# Patient Record
Sex: MALE | Race: BLACK OR AFRICAN AMERICAN | Employment: OTHER | URBAN - METROPOLITAN AREA
[De-identification: names, ages, dates, MRNs, and addresses within clinical notes are randomized per-mention and may not be internally consistent; named-entity substitution may affect disease eponyms.]

---

## 2019-02-25 ENCOUNTER — OFFICE VISIT (OUTPATIENT)
Dept: INTERNAL MEDICINE CLINIC | Age: 59
End: 2019-02-25

## 2019-02-25 VITALS
SYSTOLIC BLOOD PRESSURE: 150 MMHG | RESPIRATION RATE: 19 BRPM | BODY MASS INDEX: 24.97 KG/M2 | DIASTOLIC BLOOD PRESSURE: 100 MMHG | WEIGHT: 168.6 LBS | HEIGHT: 69 IN | TEMPERATURE: 97.9 F | OXYGEN SATURATION: 95 % | HEART RATE: 61 BPM

## 2019-02-25 DIAGNOSIS — Z12.5 PROSTATE CANCER SCREENING: ICD-10-CM

## 2019-02-25 DIAGNOSIS — R74.8 ELEVATED LIVER ENZYMES: ICD-10-CM

## 2019-02-25 DIAGNOSIS — G47.33 OSA (OBSTRUCTIVE SLEEP APNEA): ICD-10-CM

## 2019-02-25 DIAGNOSIS — E78.2 MIXED HYPERLIPIDEMIA: ICD-10-CM

## 2019-02-25 DIAGNOSIS — I10 ESSENTIAL HYPERTENSION: Primary | ICD-10-CM

## 2019-02-25 DIAGNOSIS — Z98.1 S/P LUMBAR SPINAL FUSION: ICD-10-CM

## 2019-02-25 DIAGNOSIS — Z11.59 ENCOUNTER FOR HEPATITIS C SCREENING TEST FOR LOW RISK PATIENT: ICD-10-CM

## 2019-02-25 RX ORDER — HYDROCHLOROTHIAZIDE 25 MG/1
25 TABLET ORAL DAILY
COMMUNITY
Start: 2018-11-15 | End: 2020-02-24 | Stop reason: SDUPTHER

## 2019-02-25 RX ORDER — PRAZOSIN HYDROCHLORIDE 2 MG/1
2 CAPSULE ORAL
COMMUNITY
Start: 2018-11-15 | End: 2020-10-09

## 2019-02-25 RX ORDER — DOCUSATE SODIUM 100 MG/1
100 CAPSULE, LIQUID FILLED ORAL 2 TIMES DAILY
COMMUNITY
End: 2020-09-02

## 2019-02-25 RX ORDER — SERTRALINE HYDROCHLORIDE 100 MG/1
TABLET, FILM COATED ORAL
COMMUNITY
Start: 2018-11-15 | End: 2020-03-25 | Stop reason: SDUPTHER

## 2019-02-25 RX ORDER — LOSARTAN POTASSIUM 100 MG/1
TABLET ORAL
COMMUNITY
Start: 2018-11-15 | End: 2019-11-15

## 2019-02-25 RX ORDER — NAPROXEN 500 MG/1
500 TABLET ORAL 2 TIMES DAILY WITH MEALS
Qty: 30 TAB | Refills: 3 | Status: SHIPPED | OUTPATIENT
Start: 2019-02-25 | End: 2019-02-28 | Stop reason: SDUPTHER

## 2019-02-25 NOTE — PROGRESS NOTES
New Patient Evaluation Nakita Benton is a 64 y.o. male. They are here to establish care with the group and me as a primary care provider. he has seen Dr. Dasilva Monday at [facility] in the past.  The last visit was last year. 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 He has been on Xarelto. Now off. Has three remaining hemorrhoids. He is on antidepressants. He takes this for PTSD. He is on Zoloft. He will check in with the [place] psychiatrist.   
 
He has has been having some pain in his chest.  In 2015 he had chest pain. He went to the hospital.  Had a stress test which was normal at that time. On BP medications, no recent med changes. He has had elevated liver ezymes. He was on cholesterol but stopped this due to the enzymes. He has been off statin for the past year. He has TOMASA, on CPAP Had lumbar laminectomy--stable after this. There are no active problems to display for this patient. Current Outpatient Medications Medication Sig Dispense Refill  prazosin (MINIPRESS) 1 mg capsule Take  by mouth.  hydroCHLOROthiazide (HYDRODIURIL) 25 mg tablet Take  by mouth.  losartan (COZAAR) 100 mg tablet Take  by mouth.  sertraline (ZOLOFT) 100 mg tablet Take  by mouth.  mv-mn-folic ac-vit K-herb 605 (ALIVE ONCE DAILY WOMEN 50 PLUS) 800-100 mcg tab Take  by mouth.  Bifidobacterium Infantis (ALIGN) 4 mg cap Take  by mouth.  docusate sodium (COLACE) 100 mg capsule Take 100 mg by mouth two (2) times a day. Allergies Allergen Reactions  Pcn [Penicillins] Hives and Itching History reviewed. No pertinent past medical history. Past Surgical History:  
Procedure Laterality Date [address]  HX CERVICAL FUSION  2002 Family History Problem Relation Age of Onset  Stroke Mother  Prostate Cancer Father Social History Tobacco Use  Smoking status: Never Smoker  Smokeless tobacco: Never Used Substance Use Topics  Alcohol use: No  
  Frequency: Never Comment: quit jan 2019 Health Maintenance Topic Date Due  
 Hepatitis C Screening  1960  FOBT Q 1 YEAR AGE 50-75  02/02/2012  Shingrix Vaccine Age 50> (2 of 2) 02/14/2019  
 DTaP/Tdap/Td series (2 - Td) 12/20/2028  Influenza Age 5 to Adult  Completed Review of Systems Constitutional: Negative. HENT: Negative. Respiratory: Negative for cough. Cardiovascular: Negative for chest pain and palpitations. Gastrointestinal: Negative. Musculoskeletal: Negative. All other systems reviewed and are negative. Visit Vitals BP (!) 150/100 (BP 1 Location: Right arm, BP Patient Position: Sitting) Pulse 61 Temp 97.9 °F (36.6 °C) (Oral) Resp 19 Ht 5' 9.06\" (1.754 m) Wt 168 lb 9.6 oz (76.5 kg) SpO2 95% BMI 24.86 kg/m² Physical Exam  
Constitutional: He is well-developed, well-nourished, and in no distress. HENT:  
Mouth/Throat: Oropharynx is clear and moist.  
Neck: Normal range of motion. Cardiovascular: Normal rate and regular rhythm. No murmur heard. Pulmonary/Chest: Effort normal and breath sounds normal.  
Abdominal: Soft. Bowel sounds are normal.  
Musculoskeletal: He exhibits no edema. Lymphadenopathy:  
  He has no cervical adenopathy. ASSESSMENT/PLAN Diagnoses and all orders for this visit: 1. Essential hypertension 
-     CBC WITH AUTOMATED DIFF 2. TOMASA (obstructive sleep apnea) -     REFERRAL TO SLEEP STUDIES 3. Prostate cancer screening -     PSA, DIAGNOSTIC (PROSTATE SPECIFIC AG) 4. Mixed hyperlipidemia -     METABOLIC PANEL, COMPREHENSIVE 
-     LIPID PANEL 5. Encounter for hepatitis C screening test for low risk patient 
-     HEPATITIS C AB 6. Elevated liver enzymes 7. S/P lumbar spinal fusion Follow-up Disposition: 
Return in about 6 weeks (around 4/8/2019) for Full Physical - 30 minutes appointment. 
 
-Discussed with the patient to continue the current plan of care. We will obtain baseline labwork and determine if any adjustments need to be done. We will also await the records of the previous PCP to ascertain further details of the patient's history. The patient agrees with and understands the plan of care. All questions have been answered.

## 2019-02-26 LAB
ALBUMIN SERPL-MCNC: 4.9 G/DL (ref 3.5–5.5)
ALBUMIN/GLOB SERPL: 1.9 {RATIO} (ref 1.2–2.2)
ALP SERPL-CCNC: 57 IU/L (ref 39–117)
ALT SERPL-CCNC: 88 IU/L (ref 0–44)
AST SERPL-CCNC: 120 IU/L (ref 0–40)
BASOPHILS # BLD AUTO: 0 X10E3/UL (ref 0–0.2)
BASOPHILS NFR BLD AUTO: 1 %
BILIRUB SERPL-MCNC: 0.3 MG/DL (ref 0–1.2)
BUN SERPL-MCNC: 14 MG/DL (ref 6–24)
BUN/CREAT SERPL: 17 (ref 9–20)
CALCIUM SERPL-MCNC: 9.8 MG/DL (ref 8.7–10.2)
CHLORIDE SERPL-SCNC: 101 MMOL/L (ref 96–106)
CHOLEST SERPL-MCNC: 189 MG/DL (ref 100–199)
CO2 SERPL-SCNC: 25 MMOL/L (ref 20–29)
CREAT SERPL-MCNC: 0.82 MG/DL (ref 0.76–1.27)
EOSINOPHIL # BLD AUTO: 0.1 X10E3/UL (ref 0–0.4)
EOSINOPHIL NFR BLD AUTO: 3 %
ERYTHROCYTE [DISTWIDTH] IN BLOOD BY AUTOMATED COUNT: 16.7 % (ref 12.3–15.4)
GLOBULIN SER CALC-MCNC: 2.6 G/DL (ref 1.5–4.5)
GLUCOSE SERPL-MCNC: 86 MG/DL (ref 65–99)
HCT VFR BLD AUTO: 43.8 % (ref 37.5–51)
HCV AB S/CO SERPL IA: <0.1 S/CO RATIO (ref 0–0.9)
HDLC SERPL-MCNC: 32 MG/DL
HGB BLD-MCNC: 14.4 G/DL (ref 13–17.7)
IMM GRANULOCYTES # BLD AUTO: 0 X10E3/UL (ref 0–0.1)
IMM GRANULOCYTES NFR BLD AUTO: 0 %
LDLC SERPL CALC-MCNC: 105 MG/DL (ref 0–99)
LYMPHOCYTES # BLD AUTO: 1.4 X10E3/UL (ref 0.7–3.1)
LYMPHOCYTES NFR BLD AUTO: 40 %
MCH RBC QN AUTO: 29.4 PG (ref 26.6–33)
MCHC RBC AUTO-ENTMCNC: 32.9 G/DL (ref 31.5–35.7)
MCV RBC AUTO: 90 FL (ref 79–97)
MONOCYTES # BLD AUTO: 0.3 X10E3/UL (ref 0.1–0.9)
MONOCYTES NFR BLD AUTO: 9 %
NEUTROPHILS # BLD AUTO: 1.6 X10E3/UL (ref 1.4–7)
NEUTROPHILS NFR BLD AUTO: 47 %
PLATELET # BLD AUTO: 173 X10E3/UL (ref 150–379)
POTASSIUM SERPL-SCNC: 4.8 MMOL/L (ref 3.5–5.2)
PROT SERPL-MCNC: 7.5 G/DL (ref 6–8.5)
PSA SERPL-MCNC: 0.6 NG/ML (ref 0–4)
RBC # BLD AUTO: 4.89 X10E6/UL (ref 4.14–5.8)
SODIUM SERPL-SCNC: 140 MMOL/L (ref 134–144)
TRIGL SERPL-MCNC: 258 MG/DL (ref 0–149)
VLDLC SERPL CALC-MCNC: 52 MG/DL (ref 5–40)
WBC # BLD AUTO: 3.5 X10E3/UL (ref 3.4–10.8)

## 2019-02-28 DIAGNOSIS — Z98.1 S/P LUMBAR SPINAL FUSION: ICD-10-CM

## 2019-02-28 RX ORDER — NAPROXEN 500 MG/1
500 TABLET ORAL 2 TIMES DAILY WITH MEALS
Qty: 30 TAB | Refills: 3 | Status: SHIPPED | OUTPATIENT
Start: 2019-02-28 | End: 2019-05-16

## 2019-02-28 NOTE — TELEPHONE ENCOUNTER
Roxana Patel (Self) [phone] (H)     Pt leaving tomorrow at 0400 to fly to [facility] [address] his back is causing significant pain. Says his naproxen was sent to [facility], but would like sent to [facility] today, if possible.   Pt would like a call when it's sent

## 2019-04-09 ENCOUNTER — OFFICE VISIT (OUTPATIENT)
Dept: INTERNAL MEDICINE CLINIC | Age: 59
End: 2019-04-09

## 2019-04-09 VITALS
DIASTOLIC BLOOD PRESSURE: 88 MMHG | HEART RATE: 86 BPM | TEMPERATURE: 98.7 F | RESPIRATION RATE: 18 BRPM | SYSTOLIC BLOOD PRESSURE: 125 MMHG | BODY MASS INDEX: 25.06 KG/M2 | OXYGEN SATURATION: 98 % | WEIGHT: 169.2 LBS | HEIGHT: 69 IN

## 2019-04-09 DIAGNOSIS — Z98.1 S/P LUMBAR SPINAL FUSION: ICD-10-CM

## 2019-04-09 DIAGNOSIS — Z00.00 WELL ADULT EXAM: Primary | ICD-10-CM

## 2019-04-09 DIAGNOSIS — Z12.11 COLON CANCER SCREENING: ICD-10-CM

## 2019-04-09 RX ORDER — NAPROXEN 500 MG/1
500 TABLET ORAL 2 TIMES DAILY WITH MEALS
Qty: 90 TAB | Refills: 0 | Status: CANCELLED | OUTPATIENT
Start: 2019-04-09

## 2019-04-09 NOTE — PROGRESS NOTES
Comprehensive Physical Examination    Abdirizak Diaz II is a 64 y.o. male. he presents for a comprehensive physical examination. There are no active problems to display for this patient. Current Outpatient Medications   Medication Sig Dispense Refill    naproxen (NAPROSYN) 500 mg tablet Take 1 Tab by mouth two (2) times daily (with meals). 30 Tab 3    prazosin (MINIPRESS) 1 mg capsule Take  by mouth.  hydroCHLOROthiazide (HYDRODIURIL) 25 mg tablet Take  by mouth.  losartan (COZAAR) 100 mg tablet Take  by mouth.  sertraline (ZOLOFT) 100 mg tablet Take  by mouth.  mv-mn-folic ac-vit K-herb 299 (ALIVE ONCE DAILY WOMEN 50 PLUS) 800-100 mcg tab Take  by mouth.  docusate sodium (COLACE) 100 mg capsule Take 100 mg by mouth two (2) times a day.  Bifidobacterium Infantis (ALIGN) 4 mg cap Take  by mouth. Allergies   Allergen Reactions    Pcn [Penicillins] Hives and Itching     History reviewed. No pertinent past medical history.   Past Surgical History:   Procedure Laterality Date    HX APPENDECTOMY  1975    HX CERVICAL FUSION  2002     Family History   Problem Relation Age of Onset    Stroke Mother     Prostate Cancer Father      Social History     Tobacco Use    Smoking status: Never Smoker    Smokeless tobacco: Never Used   Substance Use Topics    Alcohol use: No     Frequency: Never     Comment: quit jan 2019        Health Maintenance   Topic Date Due    FOBT Q 1 YEAR AGE 50-75  02/02/2012    Shingrix Vaccine Age 50> (2 of 2) 02/14/2019    Influenza Age 5 to Adult  08/01/2019    DTaP/Tdap/Td series (2 - Td) 12/20/2028    Hepatitis C Screening  Completed    Pneumococcal 0-64 years  Aged Out         ROS      Visit Vitals  BP (!) 140/110 (BP 1 Location: Left arm, BP Patient Position: Sitting)   Pulse 86   Temp 98.7 °F (37.1 °C) (Oral)   Resp 18   Ht 5' 9.06\" (1.754 m)   Wt 169 lb 3.2 oz (76.7 kg)   SpO2 98%   BMI 24.94 kg/m²       Physical Exam      ASSESSMENT/PLAN  Diagnoses and all orders for this visit:    1. S/P lumbar spinal fusion  -     naproxen (NAPROSYN) 500 mg tablet; Take 1 Tab by mouth two (2) times daily (with meals).     Other orders  -     OCCULT BLOOD IMMUNOASSAY,DIAGNOSTIC

## 2019-04-09 NOTE — PATIENT INSTRUCTIONS
Learning About Dietary Guidelines  What are the Dietary Guidelines for Americans? Dietary Guidelines for Americans provide tips for eating well and staying healthy. This helps reduce the risk for long-term (chronic) diseases. These adult guidelines from [place] recommend that you:  · Eat lots of fruits, vegetables, whole grains, and low-fat or nonfat dairy products. · Try to balance your eating with your activity. This helps you stay at a healthy weight. · Drink alcohol in moderation, if at all. · Limit foods high in salt, saturated fat, trans fat, and added sugar. What is MyPlate? MyPlate is the U.S[place] government's food guide. It can help you make your own well-balanced eating plan. A balanced eating plan means that you eat enough, but not too much, and that your food gives you the nutrients you need to stay healthy. MyPlate focuses on eating plenty of whole grains, fruits, and vegetables, and on limiting fat and sugar. It is available online at www. ChooseMyPlate.gov. How can you get started? MyPlate suggests that most adults eat certain amounts from the different food groups:  Grains  Eat 5 to 8 ounces of grains each day. Half of those should be whole grains. Choose whole-grain breads, cold and cooked cereals and grains, pasta (without creamy sauces), hard rolls, or low-fat or fat-free crackers. Vegetables  Eat 2 to 3 cups of vegetables every day. They contain little if any fat. And they have lots of nutrients that help protect against heart disease. Fruits  Eat 1½ to 2 cups of fruits every day. Fruits contain very little fat but lots of nutrients. Protein foods  Most adults need 5 to 6½ ounces each day. Choose fish and lean poultry more often. Eat red meat and fried meats less often. Dried beans, tofu, and nuts are also good sources of protein. Dairy  Most adults need 3 cups of milk and milk products a day. Choose low-fat or fat-free products from this food group.  If you have problems digesting milk, try eating cheese or yogurt instead. Limit fats and oils, including those used in cooking. When you do use fats, choose oils that are liquid at room temperature (unsaturated fats). These include canola oil and olive oil. Avoid foods with trans fats, such as many fried foods, cookies, and snack foods. Where can you learn more? Go to http://derek-latasha.info/. Enter A812 in the search box to learn more about \"Learning About Dietary Guidelines. \"  Current as of: March 28, 2018  Content Version: 11.9  © 0154-0047 [employer]. Care instructions adapted under license by [employer] (which disclaims liability or warranty for this information). If you have questions about a medical condition or this instruction, always ask your healthcare professional. [address] any warranty or liability for your use of this information. DASH Diet: Care Instructions  Your Care Instructions    The DASH diet is an eating plan that can help lower your blood pressure. DASH stands for Dietary Approaches to Stop Hypertension. Hypertension is high blood pressure. The DASH diet focuses on eating foods that are high in calcium, potassium, and magnesium. These nutrients can lower blood pressure. The foods that are highest in these nutrients are fruits, vegetables, low-fat dairy products, nuts, seeds, and legumes. But taking calcium, potassium, and magnesium supplements instead of eating foods that are high in those nutrients does not have the same effect. The DASH diet also includes whole grains, fish, and poultry. The DASH diet is one of several lifestyle changes your doctor may recommend to lower your high blood pressure. Your doctor may also want you to decrease the amount of sodium in your diet. Lowering sodium while following the DASH diet can lower blood pressure even further than just the DASH diet alone.   Follow-up care is a key part of your treatment and safety. Be sure to make and go to all appointments, and call your doctor if you are having problems. It's also a good idea to know your test results and keep a list of the medicines you take. How can you care for yourself at home? Following the DASH diet  · Eat 4 to 5 servings of fruit each day. A serving is 1 medium-sized piece of fruit, ½ cup chopped or canned fruit, 1/4 cup dried fruit, or 4 ounces (½ cup) of fruit juice. Choose fruit more often than fruit juice. · Eat 4 to 5 servings of vegetables each day. A serving is 1 cup of lettuce or raw leafy vegetables, ½ cup of chopped or cooked vegetables, or 4 ounces (½ cup) of vegetable juice. Choose vegetables more often than vegetable juice. · Get 2 to 3 servings of low-fat and fat-free dairy each day. A serving is 8 ounces of milk, 1 cup of yogurt, or 1 ½ ounces of cheese. · Eat 6 to 8 servings of grains each day. A serving is 1 slice of bread, 1 ounce of dry cereal, or ½ cup of cooked rice, pasta, or cooked cereal. Try to choose whole-grain products as much as possible. · Limit lean meat, poultry, and fish to 2 servings each day. A serving is 3 ounces, about the size of a deck of cards. · Eat 4 to 5 servings of nuts, seeds, and legumes (cooked dried beans, lentils, and split peas) each week. A serving is 1/3 cup of nuts, 2 tablespoons of seeds, or ½ cup of cooked beans or peas. · Limit fats and oils to 2 to 3 servings each day. A serving is 1 teaspoon of vegetable oil or 2 tablespoons of salad dressing. · Limit sweets and added sugars to 5 servings or less a week. A serving is 1 tablespoon jelly or jam, ½ cup sorbet, or 1 cup of lemonade. · Eat less than 2,300 milligrams (mg) of sodium a day. If you limit your sodium to 1,500 mg a day, you can lower your blood pressure even more. Tips for success  · Start small. Do not try to make dramatic changes to your diet all at once.  You might feel that you are missing out on your favorite foods and then be more likely to not follow the plan. Make small changes, and stick with them. Once those changes become habit, add a few more changes. · Try some of the following:  ? Make it a goal to eat a fruit or vegetable at every meal and at snacks. This will make it easy to get the recommended amount of fruits and vegetables each day. ? Try yogurt topped with fruit and nuts for a snack or healthy dessert. ? Add lettuce, tomato, cucumber, and onion to sandwiches. ? Combine a ready-made pizza crust with low-fat mozzarella cheese and lots of vegetable toppings. Try using tomatoes, squash, spinach, broccoli, carrots, cauliflower, and onions. ? Have a variety of cut-up vegetables with a low-fat dip as an appetizer instead of chips and dip. ? Sprinkle sunflower seeds or chopped almonds over salads. Or try adding chopped walnuts or almonds to cooked vegetables. ? Try some vegetarian meals using beans and peas. Add garbanzo or kidney beans to salads. Make burritos and tacos with mashed benitez beans or black beans. Where can you learn more? Go to http://derek-latasha.info/. Enter L137 in the search box to learn more about \"DASH Diet: Care Instructions. \"  Current as of: July 22, 2018  Content Version: 11.9  © 7941-9067 "[employer]. Care instructions adapted under license by [employer] (which disclaims liability or warranty for this information). If you have questions about a medical condition or this instruction, always ask your healthcare professional. [place] [address] any warranty or liability for your use of this information.

## 2019-05-16 ENCOUNTER — OFFICE VISIT (OUTPATIENT)
Dept: URGENT CARE | Age: 59
End: 2019-05-16

## 2019-05-16 VITALS
BODY MASS INDEX: 25.03 KG/M2 | DIASTOLIC BLOOD PRESSURE: 85 MMHG | HEIGHT: 69 IN | OXYGEN SATURATION: 97 % | WEIGHT: 169 LBS | RESPIRATION RATE: 18 BRPM | TEMPERATURE: 98.7 F | SYSTOLIC BLOOD PRESSURE: 134 MMHG | HEART RATE: 87 BPM

## 2019-05-16 DIAGNOSIS — R05.9 COUGH: Primary | ICD-10-CM

## 2019-05-16 RX ORDER — PREDNISONE 20 MG/1
TABLET ORAL
Qty: 6 TAB | Refills: 0 | Status: SHIPPED | OUTPATIENT
Start: 2019-05-16 | End: 2019-08-06 | Stop reason: ALTCHOICE

## 2019-05-16 RX ORDER — BENZONATATE 200 MG/1
200 CAPSULE ORAL
Qty: 21 CAP | Refills: 0 | Status: SHIPPED | OUTPATIENT
Start: 2019-05-16 | End: 2019-05-23

## 2019-05-17 NOTE — PROGRESS NOTES
Here accompanied by wife who has similar symptoms  Here for cough x 1 week  Dry to occasionally productive   Did have some wheezing in evening  He denies fever, chills, pleuritic pain, malaise or SOB  Has not tried any medications at this point  Overall not improving. No worsening. History reviewed. No pertinent past medical history.      Past Surgical History:   Procedure Laterality Date    HX APPENDECTOMY  1975    HX CERVICAL FUSION  2002         Family History   Problem Relation Age of Onset    Stroke Mother     Prostate Cancer Father         Social History     Socioeconomic History    Marital status:      Spouse name: Not on file    Number of children: Not on file    Years of education: Not on file    Highest education level: Not on file   Occupational History    Not on file   Social Needs    Financial resource strain: Not on file    Food insecurity:     Worry: Not on file     Inability: Not on file    Transportation needs:     Medical: Not on file     Non-medical: Not on file   Tobacco Use    Smoking status: Never Smoker    Smokeless tobacco: Never Used   Substance and Sexual Activity    Alcohol use: No     Frequency: Never     Comment: quit jan 2019    Drug use: No    Sexual activity: Yes     Partners: Female   Lifestyle    Physical activity:     Days per week: Not on file     Minutes per session: Not on file    Stress: Not on file   Relationships    Social connections:     Talks on phone: Not on file     Gets together: Not on file     Attends Yazidism service: Not on file     Active member of club or organization: Not on file     Attends meetings of clubs or organizations: Not on file     Relationship status: Not on file    Intimate partner violence:     Fear of current or ex partner: Not on file     Emotionally abused: Not on file     Physically abused: Not on file     Forced sexual activity: Not on file   Other Topics Concern    Not on file   Social History Narrative    Not on file                ALLERGIES: Pcn [penicillins]    Review of Systems   All other systems reviewed and are negative. Vitals:    05/16/19 1947 05/16/19 2010   BP: 150/87 134/85   Pulse: 87    Resp: 18    Temp: 98.7 °F (37.1 °C)    SpO2: 97%    Weight: 169 lb (76.7 kg)    Height: 5' 9\" (1.753 m)        Physical Exam   Constitutional: He is oriented to person, place, and time. He appears well-developed and well-nourished. HENT:   Head: Normocephalic and atraumatic. Nose: Nose normal.   Mouth/Throat: Oropharynx is clear and moist. No oropharyngeal exudate. TMs normal   Eyes: Pupils are equal, round, and reactive to light. Conjunctivae are normal. No scleral icterus. Neck: Normal range of motion. Neck supple. Cardiovascular: Normal rate, regular rhythm and normal heart sounds. Pulmonary/Chest: Effort normal and breath sounds normal. No respiratory distress. He has no wheezes. He has no rales. Abdominal: Soft. There is no rebound. Musculoskeletal:   No LE edema   Lymphadenopathy:     He has no cervical adenopathy. Neurological: He is alert and oriented to person, place, and time. Skin: Skin is warm and dry. No rash noted. Psychiatric: He has a normal mood and affect. His behavior is normal. Thought content normal.   Vitals reviewed. MDM     Differential Diagnosis; Clinical Impression; Plan:       CLINICAL IMPRESSION:  (R05) Cough  (primary encounter diagnosis)    Orders Placed This Encounter      benzonatate (TESSALON) 200 mg capsule          Sig: Take 1 Cap by mouth three (3) times daily as needed for Cough for up to 7 days. Dispense:  21 Cap          Refill:  0      predniSONE (DELTASONE) 20 mg tablet          Sig: Take 2 tabs by mouth one time daily with food for 3 days.           Dispense:  6 Tab          Refill:  0      Plan:  No particular etiology uncovered  possible viral given wife with similar  Tessalon for cough  Prednisone given reported wheeze at night although no hx of asthma. No indication for CXR today. We have reviewed concerning signs/symptoms, normal vs abnormal progression of medical condition and when to seek immediate medical attention. Schedule with PCP or Urgent Care immediately for worsening or new symptoms. See your PCP if there is not at least some improvement in symptoms within the next 1 week  You should see your PCP for updates on your routine health maintenance.              Procedures

## 2019-05-17 NOTE — PATIENT INSTRUCTIONS
Follow up with PCP without improvement over next 5-7 days sooner/immediately for new or worsening       Cough: Care Instructions  Your Care Instructions    A cough is your body's response to something that bothers your throat or airways. Many things can cause a cough. You might cough because of a cold or the flu, bronchitis, or asthma. Smoking, postnasal drip, allergies, and stomach acid that backs up into your throat also can cause coughs. A cough is a symptom, not a disease. Most coughs stop when the cause, such as a cold, goes away. You can take a few steps at home to cough less and feel better. Follow-up care is a key part of your treatment and safety. Be sure to make and go to all appointments, and call your doctor if you are having problems. It's also a good idea to know your test results and keep a list of the medicines you take. How can you care for yourself at home? · Drink lots of water and other fluids. This helps thin the mucus and soothes a dry or sore throat. Honey or lemon juice in hot water or tea may ease a dry cough. · Take cough medicine as directed by your doctor. · Prop up your head on pillows to help you breathe and ease a dry cough. · Try cough drops to soothe a dry or sore throat. Cough drops don't stop a cough. Medicine-flavored cough drops are no better than candy-flavored drops or hard candy. · Do not smoke. Avoid secondhand smoke. If you need help quitting, talk to your doctor about stop-smoking programs and medicines. These can increase your chances of quitting for good. When should you call for help? Call 911 anytime you think you may need emergency care.  For example, call if:    · You have severe trouble breathing.    Call your doctor now or seek immediate medical care if:    · You cough up blood.     · You have new or worse trouble breathing.     · You have a new or higher fever.     · You have a new rash.    Watch closely for changes in your health, and be sure to contact your doctor if:    · You cough more deeply or more often, especially if you notice more mucus or a change in the color of your mucus.     · You have new symptoms, such as a sore throat, an earache, or sinus pain.     · You do not get better as expected. Where can you learn more? Go to http://derek-latasha.info/. Enter D279 in the search box to learn more about \"Cough: Care Instructions. \"  Current as of: September 5, 2018  Content Version: 11.9  © 6181-8541 [employer]. Care instructions adapted under license by [employer] (which disclaims liability or warranty for this information). If you have questions about a medical condition or this instruction, always ask your healthcare professional. [address] any warranty or liability for your use of this information.

## 2019-07-23 ENCOUNTER — OFFICE VISIT (OUTPATIENT)
Dept: INTERNAL MEDICINE CLINIC | Age: 59
End: 2019-07-23

## 2019-07-23 ENCOUNTER — TELEPHONE (OUTPATIENT)
Dept: INTERNAL MEDICINE CLINIC | Age: 59
End: 2019-07-23

## 2019-07-23 VITALS
HEART RATE: 88 BPM | DIASTOLIC BLOOD PRESSURE: 78 MMHG | RESPIRATION RATE: 18 BRPM | HEIGHT: 69 IN | WEIGHT: 193.4 LBS | BODY MASS INDEX: 28.64 KG/M2 | OXYGEN SATURATION: 97 % | TEMPERATURE: 98.6 F | SYSTOLIC BLOOD PRESSURE: 120 MMHG

## 2019-07-23 DIAGNOSIS — I82.441 ACUTE DEEP VEIN THROMBOSIS (DVT) OF TIBIAL VEIN OF RIGHT LOWER EXTREMITY (HCC): Primary | ICD-10-CM

## 2019-07-23 DIAGNOSIS — M79.89 PAIN AND SWELLING OF RIGHT LOWER LEG: ICD-10-CM

## 2019-07-23 DIAGNOSIS — M79.661 PAIN AND SWELLING OF RIGHT LOWER LEG: ICD-10-CM

## 2019-07-23 RX ORDER — NAPROXEN SODIUM 220 MG
220 TABLET ORAL 2 TIMES DAILY WITH MEALS
Qty: 20 TAB | Refills: 0 | Status: SHIPPED | OUTPATIENT
Start: 2019-07-23 | End: 2019-07-23 | Stop reason: ALTCHOICE

## 2019-07-23 NOTE — PATIENT INSTRUCTIONS
Leg Pain: Care Instructions  Your Care Instructions  Many things can cause leg pain. Too much exercise or overuse can cause a muscle cramp (or charley horse). You can get leg cramps from not eating a balanced diet that has enough potassium, calcium, and other minerals. If you do not drink enough fluids or are taking certain medicines, you may develop leg cramps. Other causes of leg pain include injuries, blood flow problems, nerve damage, and twisted and enlarged veins (varicose veins). You can usually ease pain with self-care. Your doctor may recommend that you rest your leg and keep it elevated. Follow-up care is a key part of your treatment and safety. Be sure to make and go to all appointments, and call your doctor if you are having problems. It's also a good idea to know your test results and keep a list of the medicines you take. How can you care for yourself at home? · Take pain medicines exactly as directed. ? If the doctor gave you a prescription medicine for pain, take it as prescribed. ? If you are not taking a prescription pain medicine, ask your doctor if you can take an over-the-counter medicine. · Take any other medicines exactly as prescribed. Call your doctor if you think you are having a problem with your medicine. · Rest your leg while you have pain, and avoid standing for long periods of time. · Prop up your leg at or above the level of your heart when possible. · Make sure you are eating a balanced diet that is rich in calcium, potassium, and magnesium, especially if you are pregnant. · If directed by your doctor, put ice or a cold pack on the area for 10 to 20 minutes at a time. Put a thin cloth between the ice and your skin. · Your leg may be in a splint, a brace, or an elastic bandage, and you may have crutches to help you walk. Follow your doctor's directions about how long to wear supports and how to use the crutches. When should you call for help?   Call 911 anytime you think you may need emergency care. For example, call if:    · You have sudden chest pain and shortness of breath, or you cough up blood.     · Your leg is cool or pale or changes color.    Call your doctor now or seek immediate medical care if:    · You have increasing or severe pain.     · Your leg suddenly feels weak and you cannot move it.     · You have signs of a blood clot, such as:  ? Pain in your calf, back of the knee, thigh, or groin. ? Redness and swelling in your leg or groin.     · You have signs of infection, such as:  ? Increased pain, swelling, warmth, or redness. ? Red streaks leading from the sore area. ? Pus draining from a place on your leg. ? A fever.     · You cannot bear weight on your leg.    Watch closely for changes in your health, and be sure to contact your doctor if:    · You do not get better as expected. Where can you learn more? Go to http://derek-latasha.info/. Enter U216 in the search box to learn more about \"Leg Pain: Care Instructions. \"  Current as of: September 23, 2018  Content Version: 12.1  © 4347-5238 [employer]. Care instructions adapted under license by [employer] (which disclaims liability or warranty for this information). If you have questions about a medical condition or this instruction, always ask your healthcare professional. [address] any warranty or liability for your use of this information. Deep Vein Thrombosis: Care Instructions  Your Care Instructions    A deep vein thrombosis (DVT) is a blood clot in certain veins of the legs, pelvis, or arms. Blood clots in these veins need to be treated because they can get bigger, break loose, and travel through the bloodstream to the lungs. A blood clot in a lung can be life-threatening. The doctor may have given you a blood thinner (anticoagulant).  A blood thinner can stop the blood clot from growing larger and prevent new clots from forming. You will need to take a blood thinner for 3 to 6 months or longer. The doctor has checked you carefully, but problems can develop later. If you notice any problems or new symptoms, get medical treatment right away. Follow-up care is a key part of your treatment and safety. Be sure to make and go to all appointments, and call your doctor if you are having problems. It's also a good idea to know your test results and keep a list of the medicines you take. How can you care for yourself at home? · Take your medicines exactly as prescribed. Call your doctor if you think you are having a problem with your medicine. · If you are taking a blood thinner, be sure you get instructions about how to take your medicine safely. Blood thinners can cause serious bleeding problems. · Wear compression stockings if your doctor recommends them. These stockings are tighter at the feet than on the legs. They may reduce pain and swelling in your legs. But there are different types of stockings, and they need to fit right. So your doctor will recommend what you need. · When you sit, use a pillow to raise the arm or leg that has the blood clot. Try to keep it above the level of your heart. When should you call for help? Call 911 anytime you think you may need emergency care. For example, call if:    · You passed out (lost consciousness).     · You have symptoms of a blood clot in your lung (called a pulmonary embolism). These include:  ? Sudden chest pain. ? Trouble breathing. ? Coughing up blood.    Call your doctor now or seek immediate medical care if:    · You have new or worse trouble breathing.     · You are dizzy or lightheaded, or you feel like you may faint.     · You have symptoms of a blood clot in your arm or leg. These may include:  ? Pain in the arm, calf, back of the knee, thigh, or groin. ?  Redness and swelling in the arm, leg, or groin.    Watch closely for changes in your health, and be sure to contact your doctor if:    · You do not get better as expected. Where can you learn more? Go to http://derek-latasha.info/. Enter I701 in the search box to learn more about \"Deep Vein Thrombosis: Care Instructions. \"  Current as of: September 26, 2018  Content Version: 12.1  © 9914-5504 [employer]. Care instructions adapted under license by [employer] (which disclaims liability or warranty for this information). If you have questions about a medical condition or this instruction, always ask your healthcare professional. [address] any warranty or liability for your use of this information.

## 2019-07-23 NOTE — TELEPHONE ENCOUNTER
Right ankle swelling, moving up chin. Noticed Saturday, pain 7/10. Blood clot in left leg last July. Took Xarelto but no longer. Feeling same exact sx in right leg, difficulty ambulating. Down in [place] taking care of step father due to recent knee replacement surgery.   scheduled patient today with Dr Farnaz Dillard @ 2:20p for eval.

## 2019-07-23 NOTE — PROGRESS NOTES
HISTORY OF PRESENT ILLNESS  Maged Altamirano II is a 64 y.o. male. Leg Pain    The history is provided by the patient. This is a new problem. Episode onset: 3 d. The problem occurs constantly. The problem has not changed since onset. The pain is present in the right lower leg. The quality of the pain is described as aching. The pain is at a severity of 6/10. Associated symptoms include limited range of motion and stiffness. Pertinent negatives include no tingling. The symptoms are aggravated by contact and activity. He has tried OTC pain medications (ibuprofen) for the symptoms. The treatment provided mild relief. There has been no history of extremity trauma (however was very active preceding his symptom onset, up and down stairs). pmhx of lle dvt       Review of Systems   Musculoskeletal: Positive for stiffness. Neurological: Negative for tingling. Physical Exam   Cardiovascular: Normal rate and regular rhythm. Exam reveals no gallop and no friction rub. No murmur heard. Pulmonary/Chest: Effort normal and breath sounds normal.   Musculoskeletal:        Right ankle: He exhibits swelling. Left ankle: Normal.        Right lower leg: He exhibits no tenderness and no swelling. Feet:    Nursing note and vitals reviewed. ASSESSMENT and PLAN  Diagnoses and all orders for this visit:    1. Pain and swelling of right lower leg  -     DUPLEX LOWER EXT VENOUS BILAT; Future  -     naproxen sodium (ALEVE) 220 mg tablet; Take 1 Tab by mouth two (2) times daily (with meals) for 10 days. Start if doppler is negative for DVT    ADDENDUM- reviewed doppler with vascular tech Theresa Hollins pt back her in office.  + for dvt tibial veins    - disregard previous order for aleve  - start xarelto 15 mg bid x 3 wks    -

## 2019-08-06 ENCOUNTER — HOSPITAL ENCOUNTER (OUTPATIENT)
Dept: GENERAL RADIOLOGY | Age: 59
Discharge: HOME | End: 2019-08-06
Attending: INTERNAL MEDICINE
Payer: OTHER GOVERNMENT

## 2019-08-06 ENCOUNTER — OFFICE VISIT (OUTPATIENT)
Dept: INTERNAL MEDICINE CLINIC | Age: 59
End: 2019-08-06

## 2019-08-06 VITALS
DIASTOLIC BLOOD PRESSURE: 89 MMHG | BODY MASS INDEX: 28.64 KG/M2 | HEIGHT: 69 IN | OXYGEN SATURATION: 99 % | SYSTOLIC BLOOD PRESSURE: 143 MMHG | RESPIRATION RATE: 17 BRPM | HEART RATE: 93 BPM | WEIGHT: 193.4 LBS | TEMPERATURE: 98.3 F

## 2019-08-06 DIAGNOSIS — K59.00 CONSTIPATION, UNSPECIFIED CONSTIPATION TYPE: ICD-10-CM

## 2019-08-06 DIAGNOSIS — K59.00 CONSTIPATION, UNSPECIFIED CONSTIPATION TYPE: Primary | ICD-10-CM

## 2019-08-06 DIAGNOSIS — I82.441 ACUTE DEEP VEIN THROMBOSIS (DVT) OF TIBIAL VEIN OF RIGHT LOWER EXTREMITY (HCC): ICD-10-CM

## 2019-08-06 DIAGNOSIS — K64.9 HEMORRHOIDS, UNSPECIFIED HEMORRHOID TYPE: ICD-10-CM

## 2019-08-06 DIAGNOSIS — I82.401 RECURRENT ACUTE DEEP VEIN THROMBOSIS (DVT) OF RIGHT LOWER EXTREMITY (HCC): ICD-10-CM

## 2019-08-06 PROCEDURE — 74018 RADEX ABDOMEN 1 VIEW: CPT

## 2019-08-06 RX ORDER — SILDENAFIL 50 MG/1
50 TABLET, FILM COATED ORAL AS NEEDED
COMMUNITY
End: 2020-09-02

## 2019-08-06 RX ORDER — IBUPROFEN 800 MG/1
TABLET ORAL
COMMUNITY
End: 2019-08-06

## 2019-08-06 NOTE — PROGRESS NOTES
Follow Up Visit    Kayy Hernandez is a 64 y.o. male. he presents for Blood Clot and Medication Evaluation    The patient is here following up with his previous diagnosis of DVT. HE has been on Xarelto and tolerating this. His leg is feeling somewhat better today    Stomach bloating. This has been present since Saturday. Using fiber and colace. He had bowel movement this morning. Not passing gas. Does not feel nauseuos. Has bilateral buttock pain. He has a history of hemorrhoids. He will continue to follow this. No ibuprofen--use tylenol      Patient Active Problem List   Diagnosis Code    Skin tag of perianal region K64.4    Essential hypertension I10    Acute deep vein thrombosis (DVT) of tibial vein of right lower extremity (HCC) I82.441         Prior to Admission medications    Medication Sig Start Date End Date Taking? Authorizing Provider   ibuprofen (MOTRIN) 800 mg tablet Take  by mouth. Yes Provider, Historical   sildenafil citrate (VIAGRA) 50 mg tablet Take 50 mg by mouth as needed. Yes Provider, Historical   rivaroxaban (XARELTO) 15 mg tab tablet Take 1 Tab by mouth two (2) times daily (with meals). 7/23/19  Yes Singh Ashraf MD   prazosin (MINIPRESS) 1 mg capsule Take  by mouth. 11/15/18  Yes Provider, Historical   hydroCHLOROthiazide (HYDRODIURIL) 25 mg tablet Take  by mouth. 11/15/18  Yes Provider, Historical   losartan (COZAAR) 100 mg tablet Take  by mouth. 11/15/18 11/15/19 Yes Provider, Historical   sertraline (ZOLOFT) 100 mg tablet Take  by mouth. 11/15/18  Yes Provider, Historical   mv-mn-folic ac-vit K-herb 205 (ALIVE ONCE DAILY WOMEN 50 PLUS) 800-100 mcg tab Take  by mouth. Yes Provider, Historical   docusate sodium (COLACE) 100 mg capsule Take 100 mg by mouth two (2) times a day.    Yes Provider, Historical         Health Maintenance   Topic Date Due    FOBT Q 1 YEAR AGE 50-75  02/02/2012    Shingrix Vaccine Age 50> (2 of 2) 02/14/2019    Influenza Age 5 to Adult  08/01/2019    DTaP/Tdap/Td series (2 - Td) 12/20/2028    Hepatitis C Screening  Completed    Pneumococcal 0-64 years  Aged Out       Review of Systems   Constitutional: Negative. Respiratory: Negative. Cardiovascular: Positive for leg swelling. Visit Vitals  /89 (BP 1 Location: Right arm, BP Patient Position: Sitting)   Pulse 93   Temp 98.3 °F (36.8 °C) (Oral)   Resp 17   Ht 5' 9\" (1.753 m)   Wt 193 lb 6.4 oz (87.7 kg)   SpO2 99%   BMI 28.56 kg/m²       Physical Exam   HENT:   Mouth/Throat: Oropharynx is clear and moist.   Cardiovascular: Normal rate and regular rhythm. Pulmonary/Chest: Effort normal and breath sounds normal.   Musculoskeletal: He exhibits edema (present at the right leg). ASSESSMENT/PLAN    Diagnoses and all orders for this visit:    1. Constipation, unspecified constipation type  -     XR ABD (KUB); Future    2. Acute deep vein thrombosis (DVT) of tibial vein of right lower extremity (HCC)    3. Hemorrhoids, unspecified hemorrhoid type  -     REFERRAL TO GENERAL SURGERY    4. Recurrent acute deep vein thrombosis (DVT) of right lower extremity (HCC)  -     REFERRAL TO HEMATOLOGY      Follow-up and Dispositions    · Return in about 3 months (around 11/6/2019).

## 2019-08-13 ENCOUNTER — OFFICE VISIT (OUTPATIENT)
Dept: SURGERY | Age: 59
End: 2019-08-13

## 2019-08-13 VITALS
RESPIRATION RATE: 16 BRPM | HEART RATE: 74 BPM | SYSTOLIC BLOOD PRESSURE: 133 MMHG | BODY MASS INDEX: 28.58 KG/M2 | DIASTOLIC BLOOD PRESSURE: 92 MMHG | WEIGHT: 193 LBS | TEMPERATURE: 98.1 F | OXYGEN SATURATION: 97 % | HEIGHT: 69 IN

## 2019-08-13 DIAGNOSIS — K64.4 SKIN TAG OF PERIANAL REGION: Primary | ICD-10-CM

## 2019-08-13 PROBLEM — I10 ESSENTIAL HYPERTENSION: Status: ACTIVE | Noted: 2019-08-13

## 2019-08-13 NOTE — PROGRESS NOTES
HISTORY OF PRESENT ILLNESS  Jose Stone II is a 64 y.o. male who is referred by Kimber Padilla MD for further evaluation of perianal pain and perianal skin tags. Mr. Randall Lincoln tells me that he is s/p hemorrhoidectomy in May, 2018. (Records not available at this time.) Doing well until approximately one week ago when he began experiencing perianal pain which has improved. He reports no blood per rectum. Denies constipation. No h/o Crohn's Disease, ulcerative colitis or Colon cancer. Furthermore, colonoscopy in 2018 was significant only for hemorrhoids. (By report. Records not available at this time.)  Of note, Mr. Randall Lincoln had a DVT following hemorrhoidectomy and recently had an unprovoked DVT. Scheduled to see Dr. Janice Glez for evaluation. Past Medical History:  8/13/2019: Essential hypertension  8/13/2019: Skin tag of perianal region    Past Surgical History:  1975: HX APPENDECTOMY  2002: HX CERVICAL FUSION    Review of patient's family history indicates:  Problem: Stroke      Relation: Mother          Age of Onset: (Not Specified)  Problem: Prostate Cancer      Relation: Father          Age of Onset: (Not Specified)    Social History: Employment - Retired. Tobacco - Denies. EtOH - Denies. Review of systems negative except as noted. Review of Systems   Gastrointestinal: Positive for blood in stool and constipation. Negative for diarrhea. Genitourinary: Positive for frequency. Musculoskeletal: Positive for back pain. Psychiatric/Behavioral: Positive for depression. The patient has insomnia. Physical Exam   Constitutional: He appears well-developed and well-nourished. No distress. HENT:   Head: Normocephalic and atraumatic. Eyes: No scleral icterus. Neck: Neck supple. Cardiovascular: Normal rate and regular rhythm. Pulmonary/Chest: Effort normal and breath sounds normal.   Abdominal: Soft. He exhibits no distension. There is no tenderness.    Genitourinary:   Genitourinary Comments: No perianal abscess. No fistula-in-ano. Perianal skin tags. External hemorrhoids. No prolapsed internal hemorrhoids. Small internal hemorrhoids on anoscopy. Musculoskeletal: Normal range of motion. Lymphadenopathy:     He has no cervical adenopathy. Neurological: He is alert. Vitals reviewed. ASSESSMENT and PLAN  Explained to Mr. Tia Krishnamurthy that he appears to have external hemorrhoids and perianal skin tags. He is scheduled to see Dr. Neeraj Alicea for evaluation of an unprovoked RLE DVT. Will see in another two weeks or earlier if need be. Mr. Tia Krishnamurthy may ultimately benefit from hemorrhoidectomy and excision of the perianal skin tags. Follow up with Dr. Summer Lewis as scheduled. He is agreeable to this plan and is most certainly free to contact the office should any questions or concerns arise.      CC: MD More Banda MD

## 2019-08-13 NOTE — PROGRESS NOTES
1. Have you been to the ER, urgent care clinic since your last visit? Hospitalized since your last visit? No    2. Have you seen or consulted any other health care providers outside of the [address] [place] [address] since your last visit? Include any pap smears or colon screening.  No

## 2019-08-14 ENCOUNTER — OFFICE VISIT (OUTPATIENT)
Dept: ONCOLOGY | Age: 59
End: 2019-08-14

## 2019-08-14 VITALS
DIASTOLIC BLOOD PRESSURE: 77 MMHG | SYSTOLIC BLOOD PRESSURE: 127 MMHG | RESPIRATION RATE: 16 BRPM | HEART RATE: 95 BPM | HEIGHT: 69 IN | WEIGHT: 194.4 LBS | BODY MASS INDEX: 28.79 KG/M2 | TEMPERATURE: 98.1 F | OXYGEN SATURATION: 93 %

## 2019-08-14 DIAGNOSIS — R10.9 ABDOMINAL PAIN, UNSPECIFIED ABDOMINAL LOCATION: Primary | ICD-10-CM

## 2019-08-14 DIAGNOSIS — I82.441 ACUTE DEEP VEIN THROMBOSIS (DVT) OF TIBIAL VEIN OF RIGHT LOWER EXTREMITY (HCC): ICD-10-CM

## 2019-08-14 NOTE — PROGRESS NOTES
Dianne Johnson II is a 64 y.o. male   Chief Complaint   Patient presents with    New Patient     Blood Clot     1. Have you been to the ER, urgent care clinic since your last visit? Hospitalized since your last visit? No    2. Have you seen or consulted any other health care providers outside of the [address] [place] [address] since your last visit? Include any pap smears or colon screening.  No     Patient did have a visit in recent past for possible blood clot in R foot in [place]; informed no blood clot   Went to MD Edith Sue after visit in [place] and ruled to be a blood clot present

## 2019-08-14 NOTE — PROGRESS NOTES
[facility] [place] [id number] [address]  W: [phone]  F: [phone]    Reason for Visit:   Nurys Newman is a 64 y.o. male who is seen in consultation at the request of Dr. Zackery Hargrove for evaluation of VTE    Treatment History:   · none    History of Present Illness:   Patient is a 64 y.o. male seen for VTE    He presented with right lower extremity pain and rated at 7/10 x 3 days on 2019. There was no radiation or accompanying swelling. Pain started on his heel and moved to his ankle which got edematous. He noted some stiffness and worsening with motion. Ibuprofen did not help. Dopplers on 2019 showed a right mid and distal segment of right posterior tibial vein DVT. He was started on Xarelto and now presents to discuss further management. He did fly to [place] a few weeks prior to this episode. He goes by [facility]. He has no leg swelling . He has felt some chest tightness- this is rare and very transient. He has a h/o of Left LE DVT. He developed this in 2018 after a month of hemorrhoidectomy. He was on Xarelto for 3 months. He has no CP, Sweats, Bleeding, fevers, chills, sweats, weight or appetite changes. He has no abdominal pain , last 2 weeks he has had some tail bone pain with some tenesmus. Colonoscopy 2018 was normal.     He has never smoked    No FH of clots.  Father  of Leukemia ( exposed to agent orange)       Past Medical History:   Diagnosis Date    Essential hypertension 2019    Skin tag of perianal region 2019      Past Surgical History:   Procedure Laterality Date    HX APPENDECTOMY  1975    HX CERVICAL FUSION        Social History     Tobacco Use    Smoking status: Never Smoker    Smokeless tobacco: Never Used   Substance Use Topics    Alcohol use: No     Frequency: Never     Comment: quit 2019      Family History   Problem Relation Age of Onset    Stroke Mother     Prostate Cancer Father Current Outpatient Medications   Medication Sig    sildenafil citrate (VIAGRA) 50 mg tablet Take 50 mg by mouth as needed.  rivaroxaban (XARELTO) 15 mg tab tablet Take 1 Tab by mouth two (2) times daily (with meals).  prazosin (MINIPRESS) 1 mg capsule Take  by mouth.  hydroCHLOROthiazide (HYDRODIURIL) 25 mg tablet Take  by mouth.  losartan (COZAAR) 100 mg tablet Take  by mouth.  sertraline (ZOLOFT) 100 mg tablet Take  by mouth.  mv-mn-folic ac-vit K-herb 376 (ALIVE ONCE DAILY WOMEN 50 PLUS) 800-100 mcg tab Take  by mouth.  docusate sodium (COLACE) 100 mg capsule Take 100 mg by mouth two (2) times a day. No current facility-administered medications for this visit. Allergies   Allergen Reactions    Pcn [Penicillins] Hives and Itching        Review of Systems: A complete review of systems was obtained, negative except as described above. Physical Exam:     Visit Vitals  Ht 5' 9\" (1.753 m)   BMI 28.50 kg/m²     ECOG PS: 0  General: No distress  Eyes: PERRLA, anicteric sclerae  HENT: Atraumatic, OP clear  Neck: Supple  Lymphatic: No cervical, supraclavicular, or inguinal adenopathy  Respiratory: CTAB, normal respiratory effort  CV: Normal rate, regular rhythm, no murmurs, no peripheral edema  GI: Soft, nontender, nondistended, no masses, no hepatomegaly, no splenomegaly  MS: Normal gait and station. Digits without clubbing or cyanosis. Skin: No rashes, ecchymoses, or petechiae. Normal temperature, turgor, and texture. Psych: Alert, oriented, appropriate affect, normal judgment/insight    Results:     Lab Results   Component Value Date/Time    WBC 3.5 02/25/2019 12:28 PM    HGB 14.4 02/25/2019 12:28 PM    HCT 43.8 02/25/2019 12:28 PM    PLATELET 087 22/91/0035 12:28 PM    MCV 90 02/25/2019 12:28 PM    ABS.  NEUTROPHILS 1.6 02/25/2019 12:28 PM     Lab Results   Component Value Date/Time    Sodium 140 02/25/2019 12:28 PM    Potassium 4.8 02/25/2019 12:28 PM    Chloride 101 02/25/2019 12:28 PM    CO2 25 02/25/2019 12:28 PM    Glucose 86 02/25/2019 12:28 PM    BUN 14 02/25/2019 12:28 PM    Creatinine 0.82 02/25/2019 12:28 PM    GFR est  02/25/2019 12:28 PM    GFR est non-AA 98 02/25/2019 12:28 PM    Calcium 9.8 02/25/2019 12:28 PM     Lab Results   Component Value Date/Time    Bilirubin, total 0.3 02/25/2019 12:28 PM    ALT (SGPT) 88 (H) 02/25/2019 12:28 PM    AST (SGOT) 120 (H) 02/25/2019 12:28 PM    Alk. phosphatase 57 02/25/2019 12:28 PM    Protein, total 7.5 02/25/2019 12:28 PM    Albumin 4.9 02/25/2019 12:28 PM         Records reviewed and summarized above. Pathology report(s) reviewed above. Radiology report(s) reviewed above. Assessment:   1) Unprovoked Right LE DVT 7/23/19    Had a provoked DVT in 2018 ( after surgery)  Had an unprovoked clot on 7/23/19    Estimated risk of recurrence following cessation of anticoagulation in patients with a first unprovoked episode of VTE is 10 percent at one year and 30 percent at five years (approximately 5 percent per year after the first year)    Hence I recommend indefinite anticoagulation per ACCP guidelines    He has had age appropropriate Ca screening, I will also get a CT and ensure that there is no underlying malignancy    2) Choice of anticoagulant  Xarelto is a fine choice  He has completed 3 weeks of 15 mg BID  Can now switch to 20 mg daily- called in    3)   Uptodate    4) Psychosocial  Coping well            Plan:     · Continue 20 mg daily Xarelto indefinitely  · CT CAP - will call with results    RTC 6 months    I appreciate the opportunity to participate in Mr. Yancy Ruff II's care.     Signed By: Sakina Graham MD

## 2019-08-14 NOTE — PROGRESS NOTES
Mozella Spatz II is a 64 y.o. male  Chief Complaint   Patient presents with    New Patient     Blood Clot     1. Have you been to the ER, urgent care clinic since your last visit? Hospitalized since your last visit? 2. Have you seen or consulted any other health care providers outside of the [address] [place] [address] since your last visit? Include any pap smears or colon screening.

## 2019-08-16 ENCOUNTER — TELEPHONE (OUTPATIENT)
Dept: ONCOLOGY | Age: 59
End: 2019-08-16

## 2019-08-27 ENCOUNTER — OFFICE VISIT (OUTPATIENT)
Dept: SURGERY | Age: 59
End: 2019-08-27

## 2019-08-27 VITALS
SYSTOLIC BLOOD PRESSURE: 144 MMHG | TEMPERATURE: 98.1 F | DIASTOLIC BLOOD PRESSURE: 98 MMHG | HEIGHT: 69 IN | HEART RATE: 84 BPM | BODY MASS INDEX: 28.73 KG/M2 | WEIGHT: 194 LBS | OXYGEN SATURATION: 95 % | RESPIRATION RATE: 16 BRPM

## 2019-08-27 DIAGNOSIS — K64.4 SKIN TAG OF PERIANAL REGION: Primary | ICD-10-CM

## 2019-08-27 NOTE — PROGRESS NOTES
HISTORY OF PRESENT ILLNESS  Ishmael Rios II is a 64 y.o. male who returns for follow up of perianal pain and perianal skin tags. Mr. Odell Herrera was last seen on August 13, 2019 for evaluation of perianal pain and perianal skin tags. He has been doing well since then. No complaints today. He reports no perianal pain today. No constipation or diarrhea. No blood per rectum; however, Mr. Odell Herrera has been passing dark stool. Denies NSAID use. He has otherwise been in his usual state of health. Past Medical History:  8/13/2019: Essential hypertension  8/13/2019: Skin tag of perianal region    Past Surgical History:  1975: HX APPENDECTOMY  2002: HX CERVICAL FUSION    Review of patient's family history indicates:  Problem: Stroke      Relation: Mother          Age of Onset: (Not Specified)  Problem: Prostate Cancer      Relation: Father          Age of Onset: (Not Specified)    Social History    Socioeconomic History      Marital status:       Spouse name: Not on file      Number of children: Not on file      Years of education: Not on file      Highest education level: Not on file    Tobacco Use      Smoking status: Never Smoker      Smokeless tobacco: Never Used    Substance and Sexual Activity      Alcohol use: No        Frequency: Never        Comment: quit jan 2019      Drug use: No      Sexual activity: Yes        Partners: Female    Review of systems negative except as noted. Review of Systems   Constitutional: Negative for chills and fever. Gastrointestinal: Negative for blood in stool, constipation and diarrhea. Denies perianal pain. Passing dark stool. Physical Exam   Constitutional: He appears well-developed and well-nourished. No distress. HENT:   Head: Normocephalic and atraumatic. Eyes: No scleral icterus. Neck: Neck supple. Cardiovascular: Normal rate and regular rhythm. Pulmonary/Chest: Effort normal and breath sounds normal.   Abdominal: Soft.  He exhibits no distension. There is no tenderness. Genitourinary:   Genitourinary Comments: No fistula-in-ano or perianal abscess. No change in small external hemorrhoids. No prolapsed internal hemorrhoids. Musculoskeletal: Normal range of motion. Lymphadenopathy:     He has no cervical adenopathy. Neurological: He is alert. Vitals reviewed. ASSESSMENT and PLAN  Reassured Mr. Mohan Griggs that the small external hemorrhoids are unchanged and that at this point in time, do not believe that there is an indication for surgical intervention as they are asymptomatic. Suggested to Mr. Mohan Griggs that he increase water and fiber in diet. Follow up with Dr.'s Jermaine Ross and Fartun Bee as scheduled. Will see as needed.      CC: MD Dallas Mejia MD

## 2019-08-27 NOTE — PROGRESS NOTES
1. Have you been to the ER, urgent care clinic since your last visit? Hospitalized since your last visit? No    2. Have you seen or consulted any other health care providers outside of the [address] [place] [address] since your last visit? Include any pap smears or colon screening.  No

## 2019-10-08 ENCOUNTER — HOSPITAL ENCOUNTER (OUTPATIENT)
Dept: CT IMAGING | Age: 59
Discharge: HOME | End: 2019-10-08
Attending: INTERNAL MEDICINE
Payer: OTHER GOVERNMENT

## 2019-10-08 DIAGNOSIS — R10.9 ABDOMINAL PAIN, UNSPECIFIED ABDOMINAL LOCATION: ICD-10-CM

## 2019-10-08 LAB — CREAT BLD-MCNC: 0.9 MG/DL (ref 0.6–1.3)

## 2019-10-08 PROCEDURE — 74011636320 HC RX REV CODE- 636/320: Performed by: INTERNAL MEDICINE

## 2019-10-08 PROCEDURE — 74177 CT ABD & PELVIS W/CONTRAST: CPT

## 2019-10-08 PROCEDURE — 74011000258 HC RX REV CODE- 258: Performed by: INTERNAL MEDICINE

## 2019-10-08 PROCEDURE — 82565 ASSAY OF CREATININE: CPT

## 2019-10-08 PROCEDURE — 71260 CT THORAX DX C+: CPT

## 2019-10-08 RX ORDER — SODIUM CHLORIDE 0.9 % (FLUSH) 0.9 %
10 SYRINGE (ML) INJECTION
Status: COMPLETED | OUTPATIENT
Start: 2019-10-08 | End: 2019-10-08

## 2019-10-08 RX ADMIN — IOHEXOL 50 ML: 240 INJECTION, SOLUTION INTRATHECAL; INTRAVASCULAR; INTRAVENOUS; ORAL at 12:59

## 2019-10-08 RX ADMIN — Medication 10 ML: at 12:59

## 2019-10-08 RX ADMIN — IOPAMIDOL 100 ML: 755 INJECTION, SOLUTION INTRAVENOUS at 12:59

## 2019-10-08 RX ADMIN — SODIUM CHLORIDE 100 ML: 900 INJECTION, SOLUTION INTRAVENOUS at 12:59

## 2019-10-14 ENCOUNTER — OFFICE VISIT (OUTPATIENT)
Dept: SLEEP MEDICINE | Age: 59
End: 2019-10-14

## 2019-10-14 VITALS
HEIGHT: 69 IN | HEART RATE: 86 BPM | DIASTOLIC BLOOD PRESSURE: 84 MMHG | WEIGHT: 187 LBS | OXYGEN SATURATION: 96 % | BODY MASS INDEX: 27.7 KG/M2 | SYSTOLIC BLOOD PRESSURE: 128 MMHG

## 2019-10-14 DIAGNOSIS — K86.2 PANCREATIC CYST: Primary | ICD-10-CM

## 2019-10-14 DIAGNOSIS — G47.33 OSA (OBSTRUCTIVE SLEEP APNEA): Primary | ICD-10-CM

## 2019-10-14 RX ORDER — TRAZODONE HYDROCHLORIDE 50 MG/1
50 TABLET ORAL
COMMUNITY
End: 2020-10-08

## 2019-10-14 RX ORDER — HYDROCHLOROTHIAZIDE 25 MG/1
12.5 TABLET ORAL
COMMUNITY
Start: 2018-11-15 | End: 2020-02-24 | Stop reason: SDUPTHER

## 2019-10-14 NOTE — PROGRESS NOTES
CT reviewed and there is no evidence of a cancer which is great!     There is a cyst like small spot in the pancreas which should be followed by GI  Marcia please refer to GI Dr. Gael Levin or Genny Altamirano    There is some thickening in the rectum  He had a normal colonoscopy 1 year ago so this is not worrisome  Please check to see if he has diarrhea/ pain or bleeding    Let patient know

## 2019-10-16 ENCOUNTER — OFFICE VISIT (OUTPATIENT)
Dept: SLEEP MEDICINE | Age: 59
End: 2019-10-16

## 2019-10-16 ENCOUNTER — TELEPHONE (OUTPATIENT)
Dept: SLEEP MEDICINE | Age: 59
End: 2019-10-16

## 2019-10-16 VITALS
OXYGEN SATURATION: 97 % | HEART RATE: 82 BPM | BODY MASS INDEX: 27.85 KG/M2 | HEIGHT: 69 IN | SYSTOLIC BLOOD PRESSURE: 134 MMHG | DIASTOLIC BLOOD PRESSURE: 82 MMHG | WEIGHT: 188 LBS

## 2019-10-16 DIAGNOSIS — G47.33 OBSTRUCTIVE SLEEP APNEA (ADULT) (PEDIATRIC): Primary | ICD-10-CM

## 2019-10-16 DIAGNOSIS — I10 ESSENTIAL HYPERTENSION: ICD-10-CM

## 2019-10-16 NOTE — TELEPHONE ENCOUNTER
Patient's referral for office visit today still pending. Once we receive it, we will call and schedule HSAT  and education.

## 2019-10-16 NOTE — PROGRESS NOTES
[address] [place], [address]  Tel.  [phone]  Fax. 100 [facility] 60  [place], [address]  Tel.  [phone]  Fax. [phone] [address] [facility] [address]   Tel.  [phone]  Fax. [phone]         Subjective: Dashawn Darden II is an 64 y.o. male referred for evaluation for a sleep disorder. He complains of snoring, snorting, periods of not breathing associated with excessive daytime sleepiness. Symptoms began several years ago, gradually worsening since that time. He usually can fall asleep in 10 minutes. Family or house members note snoring, periods of not breathing. He denies falling asleep while driving. Dashawn Darden II does wake up frequently at night. He is bothered by waking up too early and left unable to get back to sleep. He actually sleeps about 5 hours at night and wakes up about 3 times during the night. He   work shifts: Nikky Johnson indicates he does get too little sleep at night. His bedtime is 2300. He awakens at 0700. He does take naps. He takes 7 naps a week lasting 2, Hour(s). He has the following observed behaviors: Loud snoring, Light snoring, Grinding teeth, Kicking with legs, Pauses in breathing; Nightmares. Other remarks: vivid dreams; prolonged awakenings at night. He is retired from [employer]. 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 he was diagnosed with sleep apnea 3 years ago. he was started on PAP at a setting of ? cm H20.  he has not been using it regularly. He has a resmed device. Newer model. He said the mask made him feel uncomfortable and reminded him of wearing gas masks in [place]. [facility]. [place] Sleepiness Score: 19   which reflect severe daytime drowsiness.     Allergies   Allergen Reactions    Pcn [Penicillins] Hives and Itching    Statins-Hmg-Coa Reductase Inhibitors Other (comments)     \"Causes increased liver enzymes levels\" Current Outpatient Medications:     hydroCHLOROthiazide (HYDRODIURIL) 25 mg tablet, Take 12.5 mg by mouth., Disp: , Rfl:     traZODone (DESYREL) 50 mg tablet, Take 50 mg by mouth., Disp: , Rfl:     rivaroxaban (XARELTO) 20 mg tab tablet, Take 1 Tab by mouth daily (with breakfast). , Disp: 30 Tab, Rfl: 6    sildenafil citrate (VIAGRA) 50 mg tablet, Take 50 mg by mouth as needed. , Disp: , Rfl:     prazosin (MINIPRESS) 1 mg capsule, Take  by mouth., Disp: , Rfl:     hydroCHLOROthiazide (HYDRODIURIL) 25 mg tablet, Take  by mouth., Disp: , Rfl:     losartan (COZAAR) 100 mg tablet, Take  by mouth., Disp: , Rfl:     sertraline (ZOLOFT) 100 mg tablet, Take  by mouth., Disp: , Rfl:     mv-mn-folic ac-vit K-herb 260 (ALIVE ONCE DAILY WOMEN 50 PLUS) 800-100 mcg tab, Take  by mouth., Disp: , Rfl:     docusate sodium (COLACE) 100 mg capsule, Take 100 mg by mouth two (2) times a day., Disp: , Rfl:      He  has a past medical history of Essential hypertension (8/13/2019) and Skin tag of perianal region (8/13/2019). He  has a past surgical history that includes hx cervical fusion (2002) and hx appendectomy (1975). He family history includes Prostate Cancer in his father; Stroke in his mother. He  reports that he has never smoked. He has never used smokeless tobacco. He reports that he does not drink alcohol or use drugs. Review of Systems:  Constitutional:  No significant weight loss or weight gain. Eyes:  No blurred vision.   CVS:  No significant chest pain  Pulm:  No significant shortness of breath  GI:  No significant nausea or vomiting  :  No significant nocturia  Musculoskeletal:  +significant joint pain at night  Skin:  No significant rashes  Neuro:  No significant dizziness   Psych:  Treated for PTSD, anxiety    Sleep Review of Systems: notable for no difficulty falling asleep; +frequent awakenings at night;  regular dreaming noted; no nightmares ; no early morning headaches; no memory problems; no concentration issues; no history of any automobile or occupational accidents due to daytime drowsiness. Objective:     Visit Vitals  /82   Pulse 82   Ht 5' 9\" (1.753 m)   Wt 188 lb (85.3 kg)   SpO2 97%   BMI 27.76 kg/m²         General:   Not in acute distress   Eyes:  Anicteric sclerae, no obvious strabismus   Nose:  No obvious nasal septum deviation    Oropharynx:   Class 3 oropharyngeal outlet, thick tongue base, enlarged and boggy uvula, low-lying soft palate, narrow tonsilo-pharyngeal pilars   Tonsils:   tonsils are present and normal   Neck:   Neck circ. in \"inches\": 16.5; midline trachea   Chest/Lungs:  Equal lung expansion, clear on auscultation    CVS:  Normal rate, regular rhythm; no JVD   Skin:  Warm to touch; no obvious rashes   Neuro:  No focal deficits ; no obvious tremor    Psych:  Normal affect,  normal countenance;          Assessment:       ICD-10-CM ICD-9-CM    1. Obstructive sleep apnea (adult) (pediatric) G47.33 327.23 SLEEP STUDY UNATTENDED, 4 CHANNEL   2. Essential hypertension I10 401.9          Plan:     * The patient currently has a High Risk for having sleep apnea. STOP-BANG score 6.  * PSG was ordered for initial evaluation. I have reviewed the different types of sleep studies. Attended sleep studies and home sleep apnea tests. Home sleep testing tests only for the presence and severity of sleep apnea. he understands that if the HSAT does not provide reliable result(such as poor data/failed HSAT recording), he may have to repeat the HSAT or come in for an attended polysomnogram.   * He was provided information on sleep apnea including coresponding risk factors and the importance of proper treatment. * Counseling was provided regarding proper sleep hygiene and safe driving. *Treatment options for sleep apnea were reviewed. He is willing to try PAP again.  When test results available, we will schedule an appointment to assess his settings and order replacement supplies and see him within 2 months after re-initiating PAP  The treatment plan was reviewed with the patient in detail and reviewed with the patient and the lead technologist. he understands that the lead technologist will be calling him  with the results and assisting with the next step in the treatment plan as outlined today during the consultation with me. All of his questions were addressed. 2. Hypertension - he continues on his current regimen. I have reviewed the relationship between hypertension as it relates to sleep-disordered breathing. Thank you for allowing us to participate in your patient's medical care. We'll keep you updated on these investigations.   Electronically signed by    Jovita Ruvalcaba MD  Diplomate in Sleep Medicine  [facility]

## 2019-10-16 NOTE — PATIENT INSTRUCTIONS
[address] [facility] [place]. [place], [address]  Tel.  [phone]  Fax. 100 [facility] 60  [place], [address] [facility]  Tel.  [phone]  Fax. [phone] [address]  Tel.  [phone]  Fax. [phone]     Sleep Apnea: After Your Visit  Your Care Instructions  Sleep apnea occurs when you frequently stop breathing for 10 seconds or longer during sleep. It can be mild to severe, based on the number of times per hour that you stop breathing or have slowed breathing. Blocked or narrowed airways in your nose, mouth, or throat can cause sleep apnea. Your airway can become blocked when your throat muscles and tongue relax during sleep. Sleep apnea is common, occurring in 1 out of 20 individuals. Individuals having any of the following characteristics should be evaluated and treated right away due to high risk and detrimental consequences from untreated sleep apnea:  1. Obesity  2. Congestive Heart failure  3. Atrial Fibrillation  4. Uncontrolled Hypertension  5. Type II Diabetes  6. Night-time Arrhythmias  7. Stroke  8. Pulmonary Hypertension  9. High-risk Driving Populations (pilots, truck drivers, etc.)  10. Patients Considering Weight-loss Surgery    How do you know you have sleep apnea? You probably have sleep apnea if you answer 'yes' to 3 or more of the following questions:  S - Have you been told that you Snore? T - Are you often Tired during the day? O - Has anyone Observed you stop breathing while sleeping? P- Do you have (or are being treated for) high blood Pressure? B - Are you obese (Body Mass Index > 35)? A - Is your Age 48years old or older? N - Is your Neck size greater than 16 inches? G - Are you male Gender? A sleep physician can prescribe a breathing device that prevents tissues in the throat from blocking your airway.  Or your doctor may recommend using a dental device (oral breathing device) to help keep your airway open. In some cases, surgery may be needed to remove enlarged tissues in the throat. Follow-up care is a key part of your treatment and safety. Be sure to make and go to all appointments, and call your doctor if you are having problems. It's also a good idea to know your test results and keep a list of the medicines you take. How can you care for yourself at home? · Lose weight, if needed. It may reduce the number of times you stop breathing or have slowed breathing. · Go to bed at the same time every night. · Sleep on your side. It may stop mild apnea. If you tend to roll onto your back, sew a pocket in the back of your pajama top. Put a tennis ball into the pocket, and stitch the pocket shut. This will help keep you from sleeping on your back. · Avoid alcohol and medicines such as sleeping pills and sedatives before bed. · Do not smoke. Smoking can make sleep apnea worse. If you need help quitting, talk to your doctor about stop-smoking programs and medicines. These can increase your chances of quitting for good. · Prop up the head of your bed 4 to 6 inches by putting bricks under the legs of the bed. · Treat breathing problems, such as a stuffy nose, caused by a cold or allergies. · Use a continuous positive airway pressure (CPAP) breathing machine if lifestyle changes do not help your apnea and your doctor recommends it. The machine keeps your airway from closing when you sleep. · If CPAP does not help you, ask your doctor whether you should try other breathing machines. A bilevel positive airway pressure machine has two types of air pressureâone for breathing in and one for breathing out. Another device raises or lowers air pressure as needed while you breathe. · If your nose feels dry or bleeds when using one of these machines, talk with your doctor about increasing moisture in the air. A humidifier may help.   · If your nose is runny or stuffy from using a breathing machine, talk with your doctor about using decongestants or a corticosteroid nasal spray. When should you call for help? Watch closely for changes in your health, and be sure to contact your doctor if:  · You still have sleep apnea even though you have made lifestyle changes. · You are thinking of trying a device such as CPAP. · You are having problems using a CPAP or similar machine. Where can you learn more? Go to [employer]. Enter E076 in the search box to learn more about \"Sleep Apnea: After Your Visit. \"   © 8122-9987 Healthwise, Incorporated. Care instructions adapted under license by [employer] (which disclaims liability or warranty for this information). This care instruction is for use with your licensed healthcare professional. If you have questions about a medical condition or this instruction, always ask your healthcare professional. Julien Dakin any warranty or liability for your use of this information. PROPER SLEEP HYGIENE    What to avoid  · Do not have drinks with caffeine, such as coffee or black tea, for 8 hours before bed. · Do not smoke or use other types of tobacco near bedtime. Nicotine is a stimulant and can keep you awake. · Avoid drinking alcohol late in the evening, because it can cause you to wake in the middle of the night. · Do not eat a big meal close to bedtime. If you are hungry, eat a light snack. · Do not drink a lot of water close to bedtime, because the need to urinate may wake you up during the night. · Do not read or watch TV in bed. Use the bed only for sleeping and sexual activity. What to try  · Go to bed at the same time every night, and wake up at the same time every morning. Do not take naps during the day. · Keep your bedroom quiet, dark, and cool. · Get regular exercise, but not within 3 to 4 hours of your bedtime. .  · Sleep on a comfortable pillow and mattress.   · If watching the clock makes you anxious, turn it facing away from you so you cannot see the time. · If you worry when you lie down, start a worry book. Well before bedtime, write down your worries, and then set the book and your concerns aside. · Try meditation or other relaxation techniques before you go to bed. · If you cannot fall asleep, get up and go to another room until you feel sleepy. Do something relaxing. Repeat your bedtime routine before you go to bed again. · Make your house quiet and calm about an hour before bedtime. Turn down the lights, turn off the TV, log off the computer, and turn down the volume on music. This can help you relax after a busy day. Drowsy Driving  The [address] [place] [address] Road Traffic Safety Administration cites drowsiness as a causing factor in more than 334,317 police reported crashes annually, resulting in 76,000 injuries and 1,500 deaths. Other surveys suggest 55% of people polled have driven while drowsy in the past year, 23% had fallen asleep but not crashed, 3% crashed, and 2% had and accident due to drowsy driving. Who is at risk? Young Drivers: One study of drowsy driving accidents states that 55% of the drivers were under 25 years. Of those, 75% were male. Shift Workers and Travelers: People who work overnight or travel across time zones frequently are at higher risk of experiencing Circadian Rhythm Disorders. They are trying to work and function when their body is programed to sleep. Sleep Deprived: Lack of sleep has a serious impact on your ability to pay attention or focus on a task. Consistently getting less than the average of 8 hours your body needs creates partial or cumulative sleep deprivation. Untreated Sleep Disorders: Sleep Apnea, Narcolepsy, R.L.S., and other sleep disorders (untreated) prevent a person from getting enough restful sleep. This leads to excessive daytime sleepiness and increases the risk for drowsy driving accidents by up to 7 times.   Medications / Alcohol: Even over the counter medications can cause drowsiness. Medications that impair a drivers attention should have a warning label. Alcohol naturally makes you sleepy and on its own can cause accidents. Combined with excessive drowsiness its effects are amplified. Signs of Drowsy Driving:   * You don't remember driving the last few miles   * You may drift out of your isidoro   * You are unable to focus and your thoughts wander   * You may yawn more often than normal   * You have difficulty keeping your eyes open / nodding off   * Missing traffic signs, speeding, or tailgating  Prevention-   Good sleep hygiene, lifestyle and behavioral choices have the most impact on drowsy driving. There is no substitute for sleep and the average person requires 8 hours nightly. If you find yourself driving drowsy, stop and sleep. Consider the sleep hygiene tips provided during your visit as well. Medication Refill Policy: Refills for all medications require 1 week advance notice. Please have your pharmacy fax a refill request. We are unable to fax, or call in \"controled substance\" medications and you will need to pick these prescriptions up from our office. [employer] Activation    Thank you for requesting access to [employer]. Please follow the instructions below to securely access and download your online medical record. [employer] allows you to send messages to your doctor, view your test results, renew your prescriptions, schedule appointments, and more. How Do I Sign Up? 1. In your internet browser, go to [email or web address]. 2. Click on the First Time User? Click Here link in the Sign In box. You will see the New Member Sign Up page. 3. Enter your [employer] Access Code exactly as it appears below. You will not need to use this code after youve completed the sign-up process. If you do not sign up before the expiration date, you must request a new code. [employer] Access Code:  Activation code not generated  Current [employer] Status: Active (This is the date your extraTKT access code will )    4. Enter the last four digits of your Social Security Number (xxxx) and Date of Birth (mm/dd/yyyy) as indicated and click Submit. You will be taken to the next sign-up page. 5. Create a LumiFold[employer] ID. This will be your extraTKT login ID and cannot be changed, so think of one that is secure and easy to remember. 6. Create a extraTKT password. You can change your password at any time. 7. Enter your Password Reset Question and Answer. This can be used at a later time if you forget your password. 8. Enter your e-mail address. You will receive e-mail notification when new information is available in [address] 9. Click Sign Up. You can now view and download portions of your medical record. 10. Click the Download Summary menu link to download a portable copy of your medical information. Additional Information    If you have questions, please call [phone]. Remember, extraTKT is NOT to be used for urgent needs. For medical emergencies, dial 911.

## 2019-11-08 ENCOUNTER — TELEPHONE (OUTPATIENT)
Dept: INTERNAL MEDICINE CLINIC | Age: 59
End: 2019-11-08

## 2019-11-15 ENCOUNTER — TELEPHONE (OUTPATIENT)
Dept: ONCOLOGY | Age: 59
End: 2019-11-15

## 2019-11-15 NOTE — TELEPHONE ENCOUNTER
Called and spoke with patient regarding CT scan results and plan   HIPPA verified x2 identifiers     Informed patient that scans were reviewed and showed no evidence of cancer which is positive news    Scans did not a \"cyst-like spot\" on the pancreas; plan is send referral for GI MD Galvan or Lico Mazariegos for follow-up     Also informed patient of thickening in the rectum; but with normal colonoscopy 1 year ago, this is not too worrisome but to continue to monitor to pain, diarrhea, or blood with bowel movements     Patient verbalized understanding and stated he did receive a text from [employer] about an appointment today and did not know what it was for but now understands  Informed patient to call their office to reschedule     No new questions or concerns at this time

## 2019-11-25 ENCOUNTER — TELEPHONE (OUTPATIENT)
Dept: INTERNAL MEDICINE CLINIC | Age: 59
End: 2019-11-25

## 2019-11-25 DIAGNOSIS — I82.441 ACUTE DEEP VEIN THROMBOSIS (DVT) OF TIBIAL VEIN OF RIGHT LOWER EXTREMITY (HCC): ICD-10-CM

## 2019-11-25 DIAGNOSIS — M79.604 PAIN OF RIGHT LOWER EXTREMITY: ICD-10-CM

## 2019-11-25 NOTE — TELEPHONE ENCOUNTER
Informed patient per provider defer to hematology. Patient received call, scheduled for US. Pt verbalized understanding.

## 2019-11-25 NOTE — TELEPHONE ENCOUNTER
Patient states retired . hemorrhoidectomy last year. Blood clot in July, surgery in may. Not as active at that time. Wed, ran on treadmill, Thursday worked with [employer] Friday pain in right thigh, moved up to groin area. Concerned about blood clot. Reached out to hematology, awaiting call back.  xfered to [facility] to discuss referral to GI

## 2019-11-26 ENCOUNTER — HOSPITAL ENCOUNTER (OUTPATIENT)
Dept: VASCULAR SURGERY | Age: 59
Discharge: HOME | End: 2019-11-26
Attending: REGISTERED NURSE
Payer: OTHER GOVERNMENT

## 2019-11-26 PROCEDURE — 93971 EXTREMITY STUDY: CPT

## 2019-11-27 ENCOUNTER — TELEPHONE (OUTPATIENT)
Dept: ONCOLOGY | Age: 59
End: 2019-11-27

## 2019-11-27 NOTE — TELEPHONE ENCOUNTER
Call and spoke with pt HIPPA verified X2 verifiers and informed pt of test results. Pt verbalized understanding. No new questions or concerns at this time. Pt also has questions about leg pain. Advised pt to take tyenol 1,000mg every 8 hours . Elevate leg and compression socks. ( spoke with Franklyn Denney about it before informing pt. Also informed pt to call us if this doesn't help.

## 2019-12-02 LAB — CREATININE, EXTERNAL: 1.1

## 2019-12-05 ENCOUNTER — TELEPHONE (OUTPATIENT)
Dept: INTERNAL MEDICINE CLINIC | Age: 59
End: 2019-12-05

## 2019-12-05 NOTE — TELEPHONE ENCOUNTER
Pt wants you to know that he went to the [place]  Did an ultra sound  On his right leg  Result no blood clot  Did lab test  Pt is out of town sched an appt for [id number]

## 2020-01-13 ENCOUNTER — OFFICE VISIT (OUTPATIENT)
Dept: INTERNAL MEDICINE CLINIC | Age: 60
End: 2020-01-13

## 2020-01-13 VITALS
DIASTOLIC BLOOD PRESSURE: 99 MMHG | BODY MASS INDEX: 27.65 KG/M2 | HEART RATE: 74 BPM | WEIGHT: 186.7 LBS | HEIGHT: 69 IN | OXYGEN SATURATION: 97 % | TEMPERATURE: 98.4 F | RESPIRATION RATE: 18 BRPM | SYSTOLIC BLOOD PRESSURE: 130 MMHG

## 2020-01-13 DIAGNOSIS — I10 ESSENTIAL HYPERTENSION: ICD-10-CM

## 2020-01-13 DIAGNOSIS — L72.3 SEBACEOUS CYST: Primary | ICD-10-CM

## 2020-01-13 NOTE — PROGRESS NOTES
Acute Care Note    Ingrid Henderson II is 64 y.o. male. he presents for evaluation of Cyst    The patient has had a \"bump\" on his back this has been present for some years. He notes that this has been smaller and larger over the years. Three days ago, he noted that the area was firmer and more painful. He tried to squeeze it and noted that he was able to get a small amount of soft, bad smelling material out. He had some improvement in the pain but the lesion was still present. He presents for evaluation of this. We have noted that his BP is elevated today. This has been in the 130's with recent testing. We discussed avoiding salty foods, monitoring weight gain. Prior to Admission medications    Medication Sig Start Date End Date Taking? Authorizing Provider   hydroCHLOROthiazide (HYDRODIURIL) 25 mg tablet Take 12.5 mg by mouth. 11/15/18  Yes Provider, Historical   traZODone (DESYREL) 50 mg tablet Take 50 mg by mouth. Yes Provider, Historical   rivaroxaban (XARELTO) 20 mg tab tablet Take 1 Tab by mouth daily (with breakfast). 8/14/19  Yes Ruddy Biggs MD   sildenafil citrate (VIAGRA) 50 mg tablet Take 50 mg by mouth as needed. Yes Provider, Historical   prazosin (MINIPRESS) 1 mg capsule Take  by mouth. 11/15/18  Yes Provider, Historical   hydroCHLOROthiazide (HYDRODIURIL) 25 mg tablet Take  by mouth. 11/15/18  Yes Provider, Historical   sertraline (ZOLOFT) 100 mg tablet Take  by mouth. 11/15/18  Yes Provider, Historical   mv-mn-folic ac-vit K-herb 667 (ALIVE ONCE DAILY WOMEN 50 PLUS) 800-100 mcg tab Take  by mouth. Yes Provider, Historical   docusate sodium (COLACE) 100 mg capsule Take 100 mg by mouth two (2) times a day.    Yes Provider, Historical         Patient Active Problem List   Diagnosis Code    Skin tag of perianal region K64.4    Essential hypertension I10    Acute deep vein thrombosis (DVT) of tibial vein of right lower extremity ([address] I82.441         Review of Systems Constitutional: Negative. Respiratory: Negative. Cardiovascular: Negative. Visit Vitals  BP (!) 130/99 (BP 1 Location: Left arm, BP Patient Position: Sitting)   Pulse 74   Temp 98.4 °F (36.9 °C) (Oral)   Resp 18   Ht 5' 9\" (1.753 m)   Wt 186 lb 11.2 oz (84.7 kg)   SpO2 97%   BMI 27.57 kg/m²       Physical Exam        ASSESSMENT/PLAN  Diagnoses and all orders for this visit:    1. Sebaceous cyst  -     REFERRAL TO DERMATOLOGY    2. Essential hypertension - Given ongoing blood pressure elevation, we will increase HCTZ to 25mg daily. Follow-up and Dispositions    · Return in about 1 month (around 2/13/2020) for Nurse visit for blood pressure check. Advised the patient to call back or return to office if symptoms worsen/change/persist.   Discussed expected course/resolution/complications of diagnosis in detail with patient. Medication risks/benefits/costs/interactions/alternatives discussed with patient. The patient was given an after visit summary which includes diagnoses, current medications, & vitals. They expressed understanding with the diagnosis and plan.

## 2020-02-18 ENCOUNTER — OFFICE VISIT (OUTPATIENT)
Dept: ONCOLOGY | Age: 60
End: 2020-02-18

## 2020-02-18 VITALS
BODY MASS INDEX: 28.44 KG/M2 | SYSTOLIC BLOOD PRESSURE: 122 MMHG | WEIGHT: 192 LBS | HEART RATE: 73 BPM | DIASTOLIC BLOOD PRESSURE: 85 MMHG | HEIGHT: 69 IN | TEMPERATURE: 97.9 F | OXYGEN SATURATION: 96 %

## 2020-02-18 DIAGNOSIS — I82.4Y9 DEEP VEIN THROMBOSIS (DVT) OF PROXIMAL LOWER EXTREMITY, UNSPECIFIED CHRONICITY, UNSPECIFIED LATERALITY (HCC): Primary | ICD-10-CM

## 2020-02-18 NOTE — PROGRESS NOTES
[facility] at [facility] AT [place] [address] [phone]  W: [phone]  F: [phone]    Reason for Visit:   Daquan Henry is a 64 y.o. male who is seen for follow up of VTE    History of Present Illness:   Patient is a 64 y.o. male seen for VTE    He presented with right lower extremity pain and rated at 7/10 x 3 days on 2019. There was no radiation or accompanying swelling. Pain started on his heel and moved to his ankle which got edematous. He noted some stiffness and worsening with motion. Ibuprofen did not help. Dopplers on 2019 showed a right mid and distal segment of right posterior tibial vein DVT. He was started on Xarelto and now presents to discuss further management. He did fly to [place] a few weeks prior to this episode. He has a h/o of Left LE DVT. He developed this in 2018 after a month of hemorrhoidectomy. He was on Xarelto for 3 months. Colonoscopy 2018 was normal. He is on indefinite anticoagulation and comes for a follow up. He gets his Xarelto at the [facility]. He has had no bleeding and has no leg swelling or pain. He has not seen his GI. Has intentionally lost 5 lb in the last year. He has had no fevers, chills, sweats. He has mild HAs though, Dr. Ingrid Dillard increased his antihypertensives. That has helped some. He has had no diarrhea or bleeding. He has never smoked    No FH of clots.  Father  of Leukemia ( exposed to agent orange)       Past Medical History:   Diagnosis Date    Essential hypertension 2019    Skin tag of perianal region 2019      Past Surgical History:   Procedure Laterality Date    HX APPENDECTOMY  1975    HX CERVICAL FUSION        Social History     Tobacco Use    Smoking status: Never Smoker    Smokeless tobacco: Never Used   Substance Use Topics    Alcohol use: No     Frequency: Never     Comment: quit 2019      Family History   Problem Relation Age of Onset    Stroke Mother    [facility] Prostate Cancer Father      Current Outpatient Medications   Medication Sig    hydroCHLOROthiazide (HYDRODIURIL) 25 mg tablet Take 12.5 mg by mouth.  traZODone (DESYREL) 50 mg tablet Take 50 mg by mouth.  rivaroxaban (XARELTO) 20 mg tab tablet Take 1 Tab by mouth daily (with breakfast).  sildenafil citrate (VIAGRA) 50 mg tablet Take 50 mg by mouth as needed.  prazosin (MINIPRESS) 1 mg capsule Take  by mouth.  hydroCHLOROthiazide (HYDRODIURIL) 25 mg tablet Take  by mouth.  sertraline (ZOLOFT) 100 mg tablet Take  by mouth.  mv-mn-folic ac-vit K-herb 592 (ALIVE ONCE DAILY WOMEN 50 PLUS) 800-100 mcg tab Take  by mouth.  docusate sodium (COLACE) 100 mg capsule Take 100 mg by mouth two (2) times a day. No current facility-administered medications for this visit. Allergies   Allergen Reactions    Pcn [Penicillins] Hives and Itching    Statins-Hmg-Coa Reductase Inhibitors Other (comments)     \"Causes increased liver enzymes levels\"        Review of Systems: A complete review of systems was obtained, negative except as described above. Physical Exam:     Visit Vitals  /85 (BP 1 Location: Right arm)   Pulse 73   Temp 97.9 °F (36.6 °C)   Ht 5' 9\" (1.753 m)   Wt 192 lb (87.1 kg)   SpO2 96%   BMI 28.35 kg/m²     ECOG PS: 0  General: No distress  Eyes: PERRLA, anicteric sclerae  HENT: Atraumatic, OP clear  Neck: Supple  MS: Normal gait and station. Digits without clubbing or cyanosis. Skin: No rashes, ecchymoses, or petechiae. Normal temperature, turgor, and texture. Psych: Alert, oriented, appropriate affect, normal judgment/insight    Results:     Lab Results   Component Value Date/Time    WBC 3.5 02/25/2019 12:28 PM    HGB 14.4 02/25/2019 12:28 PM    HCT 43.8 02/25/2019 12:28 PM    PLATELET 826 58/73/9024 12:28 PM    MCV 90 02/25/2019 12:28 PM    ABS.  NEUTROPHILS 1.6 02/25/2019 12:28 PM     Lab Results   Component Value Date/Time    Sodium 140 02/25/2019 12:28 PM    Potassium 4.8 02/25/2019 12:28 PM    Chloride 101 02/25/2019 12:28 PM    CO2 25 02/25/2019 12:28 PM    Glucose 86 02/25/2019 12:28 PM    BUN 14 02/25/2019 12:28 PM    Creatinine 0.82 02/25/2019 12:28 PM    GFR est  02/25/2019 12:28 PM    GFR est non-AA 98 02/25/2019 12:28 PM    Calcium 9.8 02/25/2019 12:28 PM    Creatinine (POC) 0.9 10/08/2019 12:45 PM     Lab Results   Component Value Date/Time    Bilirubin, total 0.3 02/25/2019 12:28 PM    ALT (SGPT) 88 (H) 02/25/2019 12:28 PM    AST (SGOT) 120 (H) 02/25/2019 12:28 PM    Alk. phosphatase 57 02/25/2019 12:28 PM    Protein, total 7.5 02/25/2019 12:28 PM    Albumin 4.9 02/25/2019 12:28 PM         Records reviewed and summarized above. Pathology report(s) reviewed above. Radiology report(s) reviewed above. Ct 10/8/2019    IMPRESSION  IMPRESSION:  1. No evidence of malignancy in the chest, abdomen, or pelvis. 2.  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 3.  Concentric narrowing of the rectum likely a function of peristalsis, as  there is no evidence of a colonic mass, however correlation with colonoscopy is  recommended. CT 12/2/2019    Unchanged    Assessment:   1) Unprovoked Right LE DVT 7/23/19    Had a provoked DVT in 2018 ( after surgery)  Had an unprovoked clot on 7/23/19    Estimated risk of recurrence following cessation of anticoagulation in patients with a first unprovoked episode of VTE is 10 percent at one year and 30 percent at five years (approximately 5 percent per year after the first year)    Hence I recommend indefinite anticoagulation per ACCP guidelines    He has had age appropropriate Ca screening, His CT in Oct and Dec 2019 were both unremarkable. Did have IPMN and needs to follow up with GI.  He missed his last appointment and needs to follow with GI    He is doing well on Xarelto    2) Choice of anticoagulant  Xarelto is a fine choice  He has been doing well    In general I consider decreasing the dose to 10 mg after 6 months of full dose anticoagulation if there was clinical equipoise. However in this case he had a clearly unprovoked event and hence I suggest continuing with 20 mg of xarelto indefinitely. [facility] will [facility]    3) HM  Uptodate    The rectal thickening seen on CT has been stable and he had a colonoscopy in 2018  He follows with GI    4) Psychosocial  Coping well  Reassured at length          Plan:     · Continue 20 mg daily Xarelto indefinitely  · Follow with GI    RTC 12 MONTHS    I appreciate the opportunity to participate in Mr. Abdirizak Diaz II's care.     Signed By: Sue Quintero MD

## 2020-02-18 NOTE — PROGRESS NOTES
Laura Myaberry II is a 64 y.o. male  Chief Complaint   Patient presents with    New Patient     Blood Clot     1. Have you been to the ER, urgent care clinic since your last visit? Hospitalized since your last visit? No  2. Have you seen or consulted any other health care providers outside of the [address] [place] [address] since your last visit? Include any pap smears or colon screening.   PCP- [facility]

## 2020-02-24 ENCOUNTER — OFFICE VISIT (OUTPATIENT)
Dept: INTERNAL MEDICINE CLINIC | Age: 60
End: 2020-02-24

## 2020-02-24 VITALS
WEIGHT: 186.4 LBS | SYSTOLIC BLOOD PRESSURE: 138 MMHG | DIASTOLIC BLOOD PRESSURE: 90 MMHG | HEIGHT: 69 IN | OXYGEN SATURATION: 98 % | HEART RATE: 76 BPM | BODY MASS INDEX: 27.61 KG/M2 | RESPIRATION RATE: 16 BRPM | TEMPERATURE: 98.5 F

## 2020-02-24 DIAGNOSIS — E78.2 MIXED HYPERLIPIDEMIA: ICD-10-CM

## 2020-02-24 DIAGNOSIS — I10 ESSENTIAL HYPERTENSION: Primary | ICD-10-CM

## 2020-02-24 DIAGNOSIS — Z12.5 PROSTATE CANCER SCREENING: ICD-10-CM

## 2020-02-24 RX ORDER — METHYLPREDNISOLONE 4 MG/1
TABLET ORAL
COMMUNITY
Start: 2020-02-19 | End: 2020-09-02

## 2020-02-24 RX ORDER — LOSARTAN POTASSIUM 100 MG/1
100 TABLET ORAL DAILY
COMMUNITY
End: 2020-02-24 | Stop reason: SDUPTHER

## 2020-02-24 RX ORDER — HYDROCHLOROTHIAZIDE 25 MG/1
25 TABLET ORAL DAILY
Qty: 90 TAB | Refills: 1 | Status: SHIPPED | OUTPATIENT
Start: 2020-02-24 | End: 2020-10-09

## 2020-02-24 RX ORDER — LOSARTAN POTASSIUM 100 MG/1
100 TABLET ORAL DAILY
Qty: 90 TAB | Refills: 1 | Status: SHIPPED | OUTPATIENT
Start: 2020-02-24 | End: 2020-07-17 | Stop reason: SDUPTHER

## 2020-02-24 NOTE — PATIENT INSTRUCTIONS
Low Sodium Diet (2,000 Milligram): Care Instructions  Your Care Instructions    Too much sodium causes your body to hold on to extra water. This can raise your blood pressure and force your heart and kidneys to work harder. In very serious cases, this could cause you to be put in the hospital. It might even be life-threatening. By limiting sodium, you will feel better and lower your risk of serious problems. The most common source of sodium is salt. People get most of the salt in their diet from canned, prepared, and packaged foods. Fast food and restaurant meals also are very high in sodium. Your doctor will probably limit your sodium to less than 2,000 milligrams (mg) a day. This limit counts all the sodium in prepared and packaged foods and any salt you add to your food. Follow-up care is a key part of your treatment and safety. Be sure to make and go to all appointments, and call your doctor if you are having problems. It's also a good idea to know your test results and keep a list of the medicines you take. How can you care for yourself at home? Read food labels  · Read labels on cans and food packages. The labels tell you how much sodium is in each serving. Make sure that you look at the serving size. If you eat more than the serving size, you have eaten more sodium. · Food labels also tell you the Percent Daily Value for sodium. Choose products with low Percent Daily Values for sodium. · Be aware that sodium can come in forms other than salt, including monosodium glutamate (MSG), sodium citrate, and sodium bicarbonate (baking soda). MSG is often added to Asian food. When you eat out, you can sometimes ask for food without MSG or added salt. Buy low-sodium foods  · Buy foods that are labeled \"unsalted\" (no salt added), \"sodium-free\" (less than 5 mg of sodium per serving), or \"low-sodium\" (less than 140 mg of sodium per serving).  Foods labeled \"reduced-sodium\" and \"light sodium\" may still have too much sodium. Be sure to read the label to see how much sodium you are getting. · Buy fresh vegetables, or frozen vegetables without added sauces. Buy low-sodium versions of canned vegetables, soups, and other canned goods. Prepare low-sodium meals  · Cut back on the amount of salt you use in cooking. This will help you adjust to the taste. Do not add salt after cooking. One teaspoon of salt has about 2,300 mg of sodium. · Take the salt shaker off the table. · Flavor your food with garlic, lemon juice, onion, vinegar, herbs, and spices. Do not use soy sauce, lite soy sauce, steak sauce, onion salt, garlic salt, celery salt, mustard, or ketchup on your food. · Use low-sodium salad dressings, sauces, and ketchup. Or make your own salad dressings and sauces without adding salt. · Use less salt (or none) when recipes call for it. You can often use half the salt a recipe calls for without losing flavor. Other foods such as rice, pasta, and grains do not need added salt. · Rinse canned vegetables, and cook them in fresh water. This removes some--but not all--of the salt. · Avoid water that is naturally high in sodium or that has been treated with water softeners, which add sodium. Call your local water company to find out the sodium content of your water supply. If you buy bottled water, read the label and choose a sodium-free brand. Avoid high-sodium foods  · Avoid eating:  ? Smoked, cured, salted, and canned meat, fish, and poultry. ? Ham, ren, hot dogs, and luncheon meats. ? Regular, hard, and processed cheese and regular peanut butter. ? Crackers with salted tops, and other salted snack foods such as pretzels, chips, and salted popcorn. ? Frozen prepared meals, unless labeled low-sodium. ? Canned and dried soups, broths, and bouillon, unless labeled sodium-free or low-sodium. ? Canned vegetables, unless labeled sodium-free or low-sodium. ? Western June fries, pizza, tacos, and other fast foods.   ? Benjaman Bark, olives, ketchup, and other condiments, especially soy sauce, unless labeled sodium-free or low-sodium. Where can you learn more? Go to http://derek-latasha.info/. Enter X645 in the search box to learn more about \"Low Sodium Diet (2,000 Milligram): Care Instructions. \"  Current as of: November 7, 2018  Content Version: 12.2  © 2767-7754 [employer]. Care instructions adapted under license by [employer] (which disclaims liability or warranty for this information). If you have questions about a medical condition or this instruction, always ask your healthcare professional. [address] any warranty or liability for your use of this information. How to Read a Food Label to Limit Sodium: Care Instructions  Your Care Instructions  Sodium causes your body to hold on to extra water. This can raise your blood pressure and force your heart and kidneys to work harder. In very serious cases, this could cause you to be put in the hospital. It might even be life-threatening. By limiting sodium, you will feel better and lower your risk of serious problems. Processed foods, fast food, and restaurant foods are the major sources of dietary sodium. The most common name for sodium is salt. Try to limit how much sodium you eat to less than 2,300 milligrams (mg) a day. If you limit your sodium to 1,500 mg a day, you can lower your blood pressure even more. This limit counts all the salt that you eat in foods you cook or in packaged foods. Keep a list of everything you eat and drink. Follow-up care is a key part of your treatment and safety. Be sure to make and go to all appointments, and call your doctor if you are having problems. It's also a good idea to know your test results and keep a list of the medicines you take. How can you care for yourself at home?   Read ingredient lists on food labels  · Read the list of ingredients on food labels to help you find how much sodium is in a food. The label lists the ingredients in a food in descending order (from the most to the least). If salt or sodium is high on the list, there may be a lot of sodium in the food. · Know that sodium has different names. Sodium is also called monosodium glutamate (MSG, common in [place] food), sodium citrate, sodium alginate, sodium hydroxide, and sodium phosphate. Read Nutrition Facts labels  · On most foods, there is a Nutrition Facts label. This will tell you how much sodium is in one serving of food. Look at both the serving size and the sodium amount. The serving size is located at the top of the label, usually right under the \"Nutrition Facts\" title. The amount of sodium is given in the list under the title. It is given in milligrams (mg). ? Check the serving size carefully. A single serving is often very small, and you may eat more than one serving. If this is the case, you will eat more sodium than listed on the label. For example, if the serving size for a canned soup is 1 cup and the sodium amount is 470 mg, if you have 2 cups you will eat 940 mg of sodium. · The nutrition facts for fresh fruits and vegetables are not listed on the food. They may be listed somewhere in the store. These foods usually have no sodium or low sodium. · The Nutrition Facts label also gives you the Percent Daily Value for sodium. This is how much of the recommended amount of sodium a serving contains. The daily value for sodium is less than 2,300 mg. So if the Percent Daily Value says 50%, this means one serving is giving you half of this, or 1,150 mg. Buy low-sodium foods  · Look for foods that are made with less sodium. Watch for the following words on the label. ? \"Unsalted\" means there is no sodium added to the food. But there may be sodium already in the food naturally. ? \"Sodium-free\" means a serving has less than 5 mg of sodium. ?  \"Very low sodium\" means a serving has 35 mg or less of sodium. ? \"Low-sodium\" means a serving has 140 mg or less of sodium. · \"Reduced-sodium\" means that there is 25% less sodium than what the food normally has. This is still usually too much sodium. Try not to buy foods with this on the label. · Buy fresh vegetables, or frozen vegetables without added sauces. Buy low-sodium versions of canned vegetables, soups, and other canned goods. Where can you learn more? Go to http://derek-latasha.info/. Enter 26 [id number] in the search box to learn more about \"How to Read a Food Label to Limit Sodium: Care Instructions. \"  Current as of: November 7, 2018  Content Version: 12.2  © 4521-1359 [employer]. Care instructions adapted under license by [employer] (which disclaims liability or warranty for this information). If you have questions about a medical condition or this instruction, always ask your healthcare professional. [place] [address] any warranty or liability for your use of this information.

## 2020-02-24 NOTE — PROGRESS NOTES
Follow Up Visit    Karlee James is a 64 y.o. male. he presents for Hypertension    Cardiovascular Review  The patient has hypertension. He reports taking medications as instructed, no medication side effects noted. He is taking Cozaar which was not previously reported to me. He gets this from the [facility].   Diet and Lifestyle: generally follows a low fat low cholesterol diet, generally follows a low sodium diet. Lab review: labs ordered today. Remains on Xarelto for his diagnosis of DVT. Patient Active Problem List   Diagnosis Code    Skin tag of perianal region K64.4    Essential hypertension I10    Acute deep vein thrombosis (DVT) of tibial vein of right lower extremity ([facility]) I82.441    Deep vein thrombosis (DVT) of proximal lower extremity ([facility] [address] I82.4Y9         Prior to Admission medications    Medication Sig Start Date End Date Taking? Authorizing Provider   multivit-min/folic/vit K/lycop (MEN'S 50 PLUS MULTIVITAMIN PO) Take 1 Tab by mouth daily. Yes Provider, Historical   losartan (COZAAR) 100 mg tablet Take 100 mg by mouth daily. Yes Provider, Historical   rivaroxaban (XARELTO) 20 mg tab tablet Take 1 Tab by mouth daily (with breakfast). 8/14/19  Yes Dolan Soulier, MD   sildenafil citrate (VIAGRA) 50 mg tablet Take 50 mg by mouth as needed. Yes Provider, Historical   prazosin (MINIPRESS) 1 mg capsule Take  by mouth. 11/15/18  Yes Provider, Historical   hydroCHLOROthiazide (HYDRODIURIL) 25 mg tablet Take 25 mg by mouth daily. 11/15/18  Yes Provider, Historical   sertraline (ZOLOFT) 100 mg tablet Take  by mouth. 11/15/18  Yes Provider, Historical   docusate sodium (COLACE) 100 mg capsule Take 100 mg by mouth two (2) times a day. Yes Provider, Historical   methylPREDNISolone (MEDROL DOSEPACK) 4 mg tablet FPD 2/19/20   Provider, Historical   traZODone (DESYREL) 50 mg tablet Take 50 mg by mouth.     Provider, Historical   hydroCHLOROthiazide (HYDRODIURIL) 25 mg tablet Take 12.5 mg by mouth. 11/15/18 2/24/20  Provider, Historical   mv-mn-folic ac-vit K-herb 399 (ALIVE ONCE DAILY WOMEN 50 PLUS) 800-100 mcg tab Take  by mouth.  2/24/20  Provider, Historical         Health Maintenance   Topic Date Due    FOBT Q1Y Age 54-65  02/02/2012    Shingrix Vaccine Age 50> (2 of 2) 02/14/2019    Influenza Age 5 to Adult  08/01/2019    Lipid Screen  12/02/2024    DTaP/Tdap/Td series (2 - Td) 12/20/2028    Hepatitis C Screening  Completed    Pneumococcal 0-64 years  Aged Out       ROS        Visit Vitals  /90 (BP 1 Location: Left arm, BP Patient Position: Sitting)   Pulse 76   Temp 98.5 °F (36.9 °C) (Oral)   Resp 16   Ht 5' 9\" (1.753 m)   Wt 186 lb 6.4 oz (84.6 kg)   SpO2 98%   BMI 27.53 kg/m²       Physical Exam      ASSESSMENT/PLAN    Diagnoses and all orders for this visit:    1. Essential hypertension  -     hydroCHLOROthiazide (HYDRODIURIL) 25 mg tablet; Take 1 Tab by mouth daily. -     losartan (COZAAR) 100 mg tablet; Take 1 Tab by mouth daily.  -     CBC WITH AUTOMATED DIFF    2. Prostate cancer screening  -     PSA, DIAGNOSTIC (PROSTATE SPECIFIC AG)    3. Mixed hyperlipidemia  -     METABOLIC PANEL, COMPREHENSIVE  -     LIPID PANEL      Follow-up and Dispositions    · Return in about 6 months (around 8/24/2020) for Full Physical - 30 minutes appointment.

## 2020-02-24 NOTE — PROGRESS NOTES
Chief Complaint   Patient presents with    Hypertension     1. Have you been to the ER, urgent care clinic since your last visit? Hospitalized since your last visit? No    2. Have you seen or consulted any other health care providers outside of the [facility] since your last visit? Include any pap smears or colon screening.  Yes Where: Dentist Reason for visit: root canal/VA-routine follow up

## 2020-02-25 LAB
ALBUMIN SERPL-MCNC: 5.3 G/DL (ref 3.8–4.9)
ALBUMIN/GLOB SERPL: 2.3 {RATIO} (ref 1.2–2.2)
ALP SERPL-CCNC: 64 IU/L (ref 39–117)
ALT SERPL-CCNC: 86 IU/L (ref 0–44)
AST SERPL-CCNC: 82 IU/L (ref 0–40)
BASOPHILS # BLD AUTO: 0 X10E3/UL (ref 0–0.2)
BASOPHILS NFR BLD AUTO: 1 %
BILIRUB SERPL-MCNC: 0.3 MG/DL (ref 0–1.2)
BUN SERPL-MCNC: 19 MG/DL (ref 6–24)
BUN/CREAT SERPL: 19 (ref 9–20)
CALCIUM SERPL-MCNC: 10 MG/DL (ref 8.7–10.2)
CHLORIDE SERPL-SCNC: 97 MMOL/L (ref 96–106)
CHOLEST SERPL-MCNC: 207 MG/DL (ref 100–199)
CO2 SERPL-SCNC: 23 MMOL/L (ref 20–29)
CREAT SERPL-MCNC: 1 MG/DL (ref 0.76–1.27)
EOSINOPHIL # BLD AUTO: 0.1 X10E3/UL (ref 0–0.4)
EOSINOPHIL NFR BLD AUTO: 2 %
ERYTHROCYTE [DISTWIDTH] IN BLOOD BY AUTOMATED COUNT: 16.4 % (ref 11.6–15.4)
GLOBULIN SER CALC-MCNC: 2.3 G/DL (ref 1.5–4.5)
GLUCOSE SERPL-MCNC: 82 MG/DL (ref 65–99)
HCT VFR BLD AUTO: 42.7 % (ref 37.5–51)
HDLC SERPL-MCNC: 38 MG/DL
HGB BLD-MCNC: 14.7 G/DL (ref 13–17.7)
IMM GRANULOCYTES # BLD AUTO: 0.1 X10E3/UL (ref 0–0.1)
IMM GRANULOCYTES NFR BLD AUTO: 1 %
LDLC SERPL CALC-MCNC: 120 MG/DL (ref 0–99)
LYMPHOCYTES # BLD AUTO: 1.8 X10E3/UL (ref 0.7–3.1)
LYMPHOCYTES NFR BLD AUTO: 34 %
MCH RBC QN AUTO: 29.2 PG (ref 26.6–33)
MCHC RBC AUTO-ENTMCNC: 34.4 G/DL (ref 31.5–35.7)
MCV RBC AUTO: 85 FL (ref 79–97)
MONOCYTES # BLD AUTO: 0.7 X10E3/UL (ref 0.1–0.9)
MONOCYTES NFR BLD AUTO: 13 %
NEUTROPHILS # BLD AUTO: 2.6 X10E3/UL (ref 1.4–7)
NEUTROPHILS NFR BLD AUTO: 49 %
PLATELET # BLD AUTO: 223 X10E3/UL (ref 150–450)
POTASSIUM SERPL-SCNC: 4.5 MMOL/L (ref 3.5–5.2)
PROT SERPL-MCNC: 7.6 G/DL (ref 6–8.5)
PSA SERPL-MCNC: 0.7 NG/ML (ref 0–4)
RBC # BLD AUTO: 5.03 X10E6/UL (ref 4.14–5.8)
SODIUM SERPL-SCNC: 139 MMOL/L (ref 134–144)
TRIGL SERPL-MCNC: 244 MG/DL (ref 0–149)
VLDLC SERPL CALC-MCNC: 49 MG/DL (ref 5–40)
WBC # BLD AUTO: 5.2 X10E3/UL (ref 3.4–10.8)

## 2020-03-24 ENCOUNTER — PATIENT MESSAGE (OUTPATIENT)
Dept: INTERNAL MEDICINE CLINIC | Age: 60
End: 2020-03-24

## 2020-03-25 DIAGNOSIS — I82.4Y9 DEEP VEIN THROMBOSIS (DVT) OF PROXIMAL LOWER EXTREMITY, UNSPECIFIED CHRONICITY, UNSPECIFIED LATERALITY (HCC): Primary | ICD-10-CM

## 2020-03-25 RX ORDER — SERTRALINE HYDROCHLORIDE 100 MG/1
100 TABLET, FILM COATED ORAL DAILY
Qty: 90 TAB | Refills: 0 | Status: SHIPPED | OUTPATIENT
Start: 2020-03-25 | End: 2020-06-08

## 2020-03-25 NOTE — TELEPHONE ENCOUNTER
From: Ina Jasso II  To: Marguerite Bailey MD  Sent: 3/24/2020 12:21 PM EDT  Subject: Prescription Question    Good morning sir. I hope you and your family are doing ok during this trying time. I hope everyone at [facility] is doing OK too. You are all in our prayers. Sir, I have run out of Sertraline, 100mg. My heart says it isn't available for a refill at this time. I am also out of Xarelto too. I really need these refilled as soon as possible. I know yall are busy and I appreciate everything you do!!! I use the [facility] at [facility] AT [address] in [place]. Thanks.

## 2020-05-22 ENCOUNTER — VIRTUAL VISIT (OUTPATIENT)
Dept: INTERNAL MEDICINE CLINIC | Age: 60
End: 2020-05-22

## 2020-07-17 DIAGNOSIS — I10 ESSENTIAL HYPERTENSION: ICD-10-CM

## 2020-07-17 RX ORDER — LOSARTAN POTASSIUM 100 MG/1
100 TABLET ORAL DAILY
Qty: 90 TAB | Refills: 1 | Status: SHIPPED | OUTPATIENT
Start: 2020-07-17 | End: 2020-07-28 | Stop reason: SDUPTHER

## 2020-07-28 DIAGNOSIS — I10 ESSENTIAL HYPERTENSION: ICD-10-CM

## 2020-07-28 DIAGNOSIS — E78.2 MIXED HYPERLIPIDEMIA: Primary | ICD-10-CM

## 2020-07-28 RX ORDER — LOSARTAN POTASSIUM 100 MG/1
100 TABLET ORAL DAILY
Qty: 90 TAB | Refills: 0 | Status: SHIPPED | OUTPATIENT
Start: 2020-07-28 | End: 2020-09-02

## 2020-09-02 ENCOUNTER — OFFICE VISIT (OUTPATIENT)
Dept: INTERNAL MEDICINE CLINIC | Age: 60
End: 2020-09-02
Payer: OTHER GOVERNMENT

## 2020-09-02 VITALS
OXYGEN SATURATION: 99 % | DIASTOLIC BLOOD PRESSURE: 72 MMHG | SYSTOLIC BLOOD PRESSURE: 98 MMHG | HEART RATE: 72 BPM | RESPIRATION RATE: 18 BRPM | HEIGHT: 69 IN | BODY MASS INDEX: 26.81 KG/M2 | TEMPERATURE: 98.2 F | WEIGHT: 181 LBS

## 2020-09-02 DIAGNOSIS — Z00.00 WELL ADULT EXAM: Primary | ICD-10-CM

## 2020-09-02 DIAGNOSIS — K59.00 CONSTIPATION, UNSPECIFIED CONSTIPATION TYPE: ICD-10-CM

## 2020-09-02 DIAGNOSIS — I82.401 RECURRENT ACUTE DEEP VEIN THROMBOSIS (DVT) OF RIGHT LOWER EXTREMITY (HCC): ICD-10-CM

## 2020-09-02 DIAGNOSIS — K64.9 HEMORRHOIDS, UNSPECIFIED HEMORRHOID TYPE: ICD-10-CM

## 2020-09-02 DIAGNOSIS — I10 ESSENTIAL HYPERTENSION: ICD-10-CM

## 2020-09-02 DIAGNOSIS — E78.2 MIXED HYPERLIPIDEMIA: ICD-10-CM

## 2020-09-02 DIAGNOSIS — R29.6 MULTIPLE FALLS: ICD-10-CM

## 2020-09-02 DIAGNOSIS — Z23 NEED FOR IMMUNIZATION AGAINST INFLUENZA: ICD-10-CM

## 2020-09-02 PROCEDURE — 90686 IIV4 VACC NO PRSV 0.5 ML IM: CPT | Performed by: INTERNAL MEDICINE

## 2020-09-02 PROCEDURE — 90471 IMMUNIZATION ADMIN: CPT | Performed by: INTERNAL MEDICINE

## 2020-09-02 PROCEDURE — 99396 PREV VISIT EST AGE 40-64: CPT | Performed by: INTERNAL MEDICINE

## 2020-09-02 RX ORDER — LISINOPRIL 40 MG/1
40 TABLET ORAL DAILY
COMMUNITY
End: 2020-10-09

## 2020-09-02 NOTE — PROGRESS NOTES
Comprehensive Physical Examination    Romy Lira II is a 64 y.o. male. he presents for a comprehensive physical examination. The patient fell last month. He hit his head at that time. Went to the ED at [facility]. Did a CT scan, normal.  Also had questionable EKG and was told that he may be admitted but ended up being discharged. Outpatient stress test was normal.  He apparently was taken off of Cozaar and was started on Lisinopril by the [place] physicians. He does not know why he fell. Was not given any explanation for this. He is not sure who he saw in the [place] and if cardiology was involved. He has had some constipation attributed to his oral iron. Patient Active Problem List    Diagnosis Date Noted    Deep vein thrombosis (DVT) of proximal lower extremity ([address] 02/18/2020    Acute deep vein thrombosis (DVT) of tibial vein of right lower extremity (HCC) 08/14/2019    Skin tag of perianal region 08/13/2019    Essential hypertension 08/13/2019     Current Outpatient Medications   Medication Sig Dispense Refill    sertraline (ZOLOFT) 100 mg tablet TAKE 1 TABLET BY MOUTH EVERY DAY 90 Tab 0    rivaroxaban (Xarelto) 20 mg tab tablet Take 1 Tab by mouth daily (with breakfast). 30 Tab 6    multivit-min/folic/vit K/lycop (MEN'S 50 PLUS MULTIVITAMIN PO) Take 1 Tab by mouth daily.  hydroCHLOROthiazide (HYDRODIURIL) 25 mg tablet Take 1 Tab by mouth daily. 90 Tab 1    traZODone (DESYREL) 50 mg tablet Take 50 mg by mouth.  prazosin (MINIPRESS) 1 mg capsule Take  by mouth.  losartan (COZAAR) 100 mg tablet Take 1 Tab by mouth daily. 90 Tab 0    methylPREDNISolone (MEDROL DOSEPACK) 4 mg tablet FPD      sildenafil citrate (VIAGRA) 50 mg tablet Take 50 mg by mouth as needed.  docusate sodium (COLACE) 100 mg capsule Take 100 mg by mouth two (2) times a day.        Allergies   Allergen Reactions    Pcn [Penicillins] Hives and Itching    Statins-Hmg-Coa Reductase Inhibitors Other (comments)     \"Causes increased liver enzymes levels\"     Past Medical History:   Diagnosis Date    Essential hypertension 8/13/2019    Skin tag of perianal region 8/13/2019     Past Surgical History:   Procedure Laterality Date    HX APPENDECTOMY  1975    HX CERVICAL FUSION  2002     Family History   Problem Relation Age of Onset    Stroke Mother     Prostate Cancer Father      Social History     Tobacco Use    Smoking status: Never Smoker    Smokeless tobacco: Never Used   Substance Use Topics    Alcohol use: No     Frequency: Never     Comment: quit jan 2019        Health Maintenance   Topic Date Due    FOBT Q1Y Age 54-65  02/02/2012    Shingrix Vaccine Age 50> (2 of 2) 02/14/2019    Flu Vaccine (1) 09/01/2020    Lipid Screen  02/24/2025    DTaP/Tdap/Td series (2 - Td) 12/20/2028    Hepatitis C Screening  Completed    Pneumococcal 0-64 years  Aged Out         Review of Systems   Constitutional: Negative. Respiratory: Negative. Cardiovascular: Negative. Gastrointestinal: Negative. Visit Vitals  BP 98/72 (BP 1 Location: Left arm)   Pulse 72   Temp 98.2 °F (36.8 °C) (Temporal)   Resp 18   Ht 5' 9\" (1.753 m)   Wt 181 lb (82.1 kg)   SpO2 99%   BMI 26.73 kg/m²       Physical Exam      ASSESSMENT/PLAN  Diagnoses and all orders for this visit:    1. Well adult exam    2. Multiple falls - he is unsure of the reason why he has fallen and does not remember if anything was explained to him during his ED visit. Advised that I would need records from the [place] in order to make any judgements regarding this. 3. Essential hypertension - His pressure is low today, he is not symptomatic. Apparently, this is being managed by [place] physicians. Records requested. Advised the patient to stop lisinopril and call the [place] if he has any dizziness or lightheadedness. 4. Mixed hyperlipidemia    5. Constipation, unspecified constipation type    6. Hemorrhoids, unspecified hemorrhoid type    7. Need for immunization against influenza  -     [place] IMMUNIZ ADMIN,1 SINGLE/COMB VAC/TOXOID  -     INFLUENZA VIRUS VAC QUAD,SPLIT,PRESV FREE SYRINGE IM    8. Recurrent acute deep vein thrombosis (DVT) of right lower extremity ([facility]      Follow-up and Dispositions    · Return in about 6 months (around 3/2/2021) for Follow up.

## 2020-09-09 ENCOUNTER — OFFICE VISIT (OUTPATIENT)
Dept: INTERNAL MEDICINE CLINIC | Age: 60
End: 2020-09-09
Payer: OTHER GOVERNMENT

## 2020-09-09 VITALS
HEIGHT: 69 IN | OXYGEN SATURATION: 98 % | BODY MASS INDEX: 26.96 KG/M2 | WEIGHT: 182 LBS | HEART RATE: 67 BPM | DIASTOLIC BLOOD PRESSURE: 70 MMHG | RESPIRATION RATE: 16 BRPM | TEMPERATURE: 98 F | SYSTOLIC BLOOD PRESSURE: 100 MMHG

## 2020-09-09 DIAGNOSIS — Z02.89 ENCOUNTER FOR COMPLETION OF FORM WITH PATIENT: ICD-10-CM

## 2020-09-09 DIAGNOSIS — F32.89 OTHER DEPRESSION: Primary | ICD-10-CM

## 2020-09-09 PROCEDURE — 99214 OFFICE O/P EST MOD 30 MIN: CPT | Performed by: INTERNAL MEDICINE

## 2020-09-09 NOTE — PROGRESS NOTES
Chief Complaint   Patient presents with    Form Completion     Reviewed record in preparation for visit and have obtained necessary documentation. Identified pt with two pt identifiers(name and ). Health Maintenance Due   Topic    FOBT Q1Y Age 54-65     Shingrix Vaccine Age 49> (2 of 2)    Flu Vaccine (1)         Chief Complaint   Patient presents with    Form Completion        Wt Readings from Last 3 Encounters:   20 182 lb (82.6 kg)   20 181 lb (82.1 kg)   20 186 lb 6.4 oz (84.6 kg)     Temp Readings from Last 3 Encounters:   20 98 °F (36.7 °C) (Temporal)   20 98.2 °F (36.8 °C) (Temporal)   20 98.5 °F (36.9 °C) (Oral)     BP Readings from Last 3 Encounters:   20 100/70   20 98/72   20 138/90     Pulse Readings from Last 3 Encounters:   20 67   20 72   20 76           Learning Assessment:  :     Learning Assessment 2019   PRIMARY LEARNER Patient   HIGHEST LEVEL OF EDUCATION - PRIMARY LEARNER  > 4 YEARS OF COLLEGE   BARRIERS PRIMARY LEARNER EMOTIONAL   CO-LEARNER CAREGIVER No   PRIMARY LANGUAGE ENGLISH    NEED No   LEARNER PREFERENCE PRIMARY LISTENING   LEARNING SPECIAL TOPICS none   ANSWERED BY patient   RELATIONSHIP SELF       Depression Screening:  :     3 most recent PHQ Screens 2020   PHQ Not Done -   Little interest or pleasure in doing things Not at all   Feeling down, depressed, irritable, or hopeless Not at all   Total Score PHQ 2 0       Fall Risk Assessment:  :     No flowsheet data found. Abuse Screening:  :     Abuse Screening Questionnaire 2019   Do you ever feel afraid of your partner? N   Are you in a relationship with someone who physically or mentally threatens you? N   Is it safe for you to go home?  Y       Coordination of Care Questionnaire:  :     1) Have you been to an emergency room, urgent care clinic since your last visit? no   Hospitalized since your last visit? no 2) Have you seen or consulted any other health care providers outside of [address] [place] [address] since your last visit? no  (Include any pap smears or colon screenings in this section.)    3) Do you have an Advance Directive on file? no    4) Are you interested in receiving information on Advance Directives? NO      Patient is accompanied by self I have received verbal consent from Jostin Ann II to discuss any/all medical information while they are present in the room. Reviewed record  In preparation for visit and have obtained necessary documentation.

## 2020-09-09 NOTE — PROGRESS NOTES
Follow Up Visit    Clark Suarez is a 64 y.o. male. he presents for Form Completion    He presents with a form from the [employer]. Filled this out today. We also discussed his depression. He has been on Zoloft consistently but still has ongoing symptoms. He is seeing a psychiatrist at the [place] but is still wanting to follow up with a counselor. Will order a referral.     Patient Active Problem List   Diagnosis Code    Skin tag of perianal region K64.4    Essential hypertension I10    Acute deep vein thrombosis (DVT) of tibial vein of right lower extremity ([facility]) I82.441    Deep vein thrombosis (DVT) of proximal lower extremity ([facility] I82.4Y9         Prior to Admission medications    Medication Sig Start Date End Date Taking? Authorizing Provider   lisinopriL (PRINIVIL, ZESTRIL) 40 mg tablet Take 40 mg by mouth daily. Yes Provider, Historical   sertraline (ZOLOFT) 100 mg tablet TAKE 1 TABLET BY MOUTH EVERY DAY 6/8/20  Yes Cristela Lomeli NP   rivaroxaban (Xarelto) 20 mg tab tablet Take 1 Tab by mouth daily (with breakfast). 3/25/20  Yes Corrie Paredes NP   multivit-min/folic/vit K/lycop (MEN'S 50 PLUS MULTIVITAMIN PO) Take 1 Tab by mouth daily. Yes Provider, Historical   hydroCHLOROthiazide (HYDRODIURIL) 25 mg tablet Take 1 Tab by mouth daily. 2/24/20  Yes Karen Huddleston MD   traZODone (DESYREL) 50 mg tablet Take 50 mg by mouth. Provider, Historical   prazosin (MINIPRESS) 1 mg capsule Take  by mouth. 11/15/18   Provider, Historical         Health Maintenance   Topic Date Due    FOBT Q1Y Age 54-65  02/02/2012    Shingrix Vaccine Age 50> (2 of 2) 02/14/2019    Flu Vaccine (1) 09/01/2020    Lipid Screen  02/24/2025    DTaP/Tdap/Td series (2 - Td) 12/20/2028    Hepatitis C Screening  Completed    Pneumococcal 0-64 years  Aged Out       Review of Systems   Constitutional: Negative. Respiratory: Negative. Cardiovascular: Negative. Gastrointestinal: Negative. Visit Vitals  /70 (BP 1 Location: Left arm, BP Patient Position: Sitting)   Pulse 67   Temp 98 °F (36.7 °C) (Temporal)   Resp 16   Ht 5' 9\" (1.753 m)   Wt 182 lb (82.6 kg)   SpO2 98%   BMI 26.88 kg/m²       Physical Exam  Constitutional:       Appearance: He is well-developed. Cardiovascular:      Rate and Rhythm: Normal rate and regular rhythm. Pulmonary:      Effort: Pulmonary effort is normal.      Breath sounds: Normal breath sounds. ASSESSMENT/PLAN    Diagnoses and all orders for this visit:    1. Other depression  -     REFERRAL TO PSYCHOLOGY    2. Encounter for completion of form with patient      Follow-up and Dispositions    · Return if symptoms worsen or fail to improve.

## 2020-10-08 ENCOUNTER — APPOINTMENT (OUTPATIENT)
Dept: CT IMAGING | Age: 60
DRG: 684 | End: 2020-10-08
Attending: INTERNAL MEDICINE
Payer: OTHER GOVERNMENT

## 2020-10-08 ENCOUNTER — HOSPITAL ENCOUNTER (INPATIENT)
Age: 60
LOS: 1 days | Discharge: HOME | DRG: 684 | End: 2020-10-09
Attending: EMERGENCY MEDICINE | Admitting: INTERNAL MEDICINE
Payer: OTHER GOVERNMENT

## 2020-10-08 DIAGNOSIS — N17.9 ACUTE KIDNEY INJURY (HCC): Primary | ICD-10-CM

## 2020-10-08 DIAGNOSIS — R10.84 ABDOMINAL PAIN, GENERALIZED: ICD-10-CM

## 2020-10-08 DIAGNOSIS — I95.9 HYPOTENSION, UNSPECIFIED HYPOTENSION TYPE: ICD-10-CM

## 2020-10-08 PROBLEM — I82.409 DVT, LOWER EXTREMITY (HCC): Status: ACTIVE | Noted: 2020-02-18

## 2020-10-08 LAB
ALBUMIN SERPL-MCNC: 4.4 G/DL (ref 3.5–5)
ALBUMIN/GLOB SERPL: 1.3 {RATIO} (ref 1.1–2.2)
ALP SERPL-CCNC: 61 U/L (ref 45–117)
ALT SERPL-CCNC: 68 U/L (ref 12–78)
ANION GAP SERPL CALC-SCNC: 10 MMOL/L (ref 5–15)
APPEARANCE UR: ABNORMAL
AST SERPL-CCNC: 71 U/L (ref 15–37)
BACTERIA URNS QL MICRO: NEGATIVE /HPF
BASOPHILS # BLD: 0 K/UL (ref 0–0.1)
BASOPHILS NFR BLD: 0 % (ref 0–1)
BILIRUB SERPL-MCNC: 0.4 MG/DL (ref 0.2–1)
BILIRUB UR QL: NEGATIVE
BUN SERPL-MCNC: 32 MG/DL (ref 6–20)
BUN/CREAT SERPL: 13 (ref 12–20)
CALCIUM SERPL-MCNC: 9.4 MG/DL (ref 8.5–10.1)
CAOX CRY URNS QL MICRO: ABNORMAL
CHLORIDE SERPL-SCNC: 103 MMOL/L (ref 97–108)
CO2 SERPL-SCNC: 22 MMOL/L (ref 21–32)
COLOR UR: ABNORMAL
COMMENT, HOLDF: NORMAL
COMMENT, HOLDF: NORMAL
CREAT SERPL-MCNC: 2.54 MG/DL (ref 0.7–1.3)
CREAT UR-MCNC: 233 MG/DL
DIFFERENTIAL METHOD BLD: ABNORMAL
EOSINOPHIL # BLD: 0 K/UL (ref 0–0.4)
EOSINOPHIL NFR BLD: 0 % (ref 0–7)
EPITH CASTS URNS QL MICRO: ABNORMAL /LPF
ERYTHROCYTE [DISTWIDTH] IN BLOOD BY AUTOMATED COUNT: 15.8 % (ref 11.5–14.5)
GLOBULIN SER CALC-MCNC: 3.4 G/DL (ref 2–4)
GLUCOSE SERPL-MCNC: 115 MG/DL (ref 65–100)
GLUCOSE UR STRIP.AUTO-MCNC: NEGATIVE MG/DL
HCT VFR BLD AUTO: 36.5 % (ref 36.6–50.3)
HEMOCCULT STL QL: NEGATIVE
HGB BLD-MCNC: 12.5 G/DL (ref 12.1–17)
HGB UR QL STRIP: NEGATIVE
HYALINE CASTS URNS QL MICRO: >20 /LPF (ref 0–5)
IMM GRANULOCYTES # BLD AUTO: 0 K/UL (ref 0–0.04)
IMM GRANULOCYTES NFR BLD AUTO: 0 % (ref 0–0.5)
INR PPP: 1.4 (ref 0.9–1.1)
KETONES UR QL STRIP.AUTO: NEGATIVE MG/DL
LEUKOCYTE ESTERASE UR QL STRIP.AUTO: NEGATIVE
LYMPHOCYTES # BLD: 1.1 K/UL (ref 0.8–3.5)
LYMPHOCYTES NFR BLD: 18 % (ref 12–49)
MCH RBC QN AUTO: 30 PG (ref 26–34)
MCHC RBC AUTO-ENTMCNC: 34.2 G/DL (ref 30–36.5)
MCV RBC AUTO: 87.7 FL (ref 80–99)
MONOCYTES # BLD: 0.5 K/UL (ref 0–1)
MONOCYTES NFR BLD: 9 % (ref 5–13)
NEUTS SEG # BLD: 4.4 K/UL (ref 1.8–8)
NEUTS SEG NFR BLD: 73 % (ref 32–75)
NITRITE UR QL STRIP.AUTO: NEGATIVE
NRBC # BLD: 0 K/UL (ref 0–0.01)
NRBC BLD-RTO: 0 PER 100 WBC
PH UR STRIP: 5 [PH] (ref 5–8)
PLATELET # BLD AUTO: 173 K/UL (ref 150–400)
PMV BLD AUTO: 10.5 FL (ref 8.9–12.9)
POTASSIUM SERPL-SCNC: 4.5 MMOL/L (ref 3.5–5.1)
PROT SERPL-MCNC: 7.8 G/DL (ref 6.4–8.2)
PROT UR STRIP-MCNC: NEGATIVE MG/DL
PROTHROMBIN TIME: 14 SEC (ref 9–11.1)
RBC # BLD AUTO: 4.16 M/UL (ref 4.1–5.7)
RBC #/AREA URNS HPF: ABNORMAL /HPF (ref 0–5)
SAMPLES BEING HELD,HOLD: NORMAL
SAMPLES BEING HELD,HOLD: NORMAL
SODIUM SERPL-SCNC: 135 MMOL/L (ref 136–145)
SODIUM UR-SCNC: 44 MMOL/L
SP GR UR REFRACTOMETRY: 1.01 (ref 1–1.03)
TROPONIN I SERPL-MCNC: <0.05 NG/ML
UA: UC IF INDICATED,UAUC: ABNORMAL
UROBILINOGEN UR QL STRIP.AUTO: 0.2 EU/DL (ref 0.2–1)
WBC # BLD AUTO: 6 K/UL (ref 4.1–11.1)
WBC URNS QL MICRO: ABNORMAL /HPF (ref 0–4)

## 2020-10-08 PROCEDURE — 86900 BLOOD TYPING SEROLOGIC ABO: CPT

## 2020-10-08 PROCEDURE — 82272 OCCULT BLD FECES 1-3 TESTS: CPT

## 2020-10-08 PROCEDURE — 99285 EMERGENCY DEPT VISIT HI MDM: CPT

## 2020-10-08 PROCEDURE — 82570 ASSAY OF URINE CREATININE: CPT

## 2020-10-08 PROCEDURE — 84484 ASSAY OF TROPONIN QUANT: CPT

## 2020-10-08 PROCEDURE — 96360 HYDRATION IV INFUSION INIT: CPT

## 2020-10-08 PROCEDURE — 93005 ELECTROCARDIOGRAM TRACING: CPT

## 2020-10-08 PROCEDURE — 84300 ASSAY OF URINE SODIUM: CPT

## 2020-10-08 PROCEDURE — 65270000029 HC RM PRIVATE

## 2020-10-08 PROCEDURE — 74011250636 HC RX REV CODE- 250/636: Performed by: EMERGENCY MEDICINE

## 2020-10-08 PROCEDURE — 74176 CT ABD & PELVIS W/O CONTRAST: CPT

## 2020-10-08 PROCEDURE — 85025 COMPLETE CBC W/AUTO DIFF WBC: CPT

## 2020-10-08 PROCEDURE — 36415 COLL VENOUS BLD VENIPUNCTURE: CPT

## 2020-10-08 PROCEDURE — 74011250636 HC RX REV CODE- 250/636: Performed by: INTERNAL MEDICINE

## 2020-10-08 PROCEDURE — 80053 COMPREHEN METABOLIC PANEL: CPT

## 2020-10-08 PROCEDURE — 81001 URINALYSIS AUTO W/SCOPE: CPT

## 2020-10-08 PROCEDURE — 85610 PROTHROMBIN TIME: CPT

## 2020-10-08 RX ORDER — SODIUM CHLORIDE 0.9 % (FLUSH) 0.9 %
5-40 SYRINGE (ML) INJECTION EVERY 8 HOURS
Status: DISCONTINUED | OUTPATIENT
Start: 2020-10-08 | End: 2020-10-09 | Stop reason: HOSPADM

## 2020-10-08 RX ORDER — MULTIVITAMIN WITH IRON
1 TABLET ORAL DAILY
Status: DISCONTINUED | OUTPATIENT
Start: 2020-10-09 | End: 2020-10-09 | Stop reason: HOSPADM

## 2020-10-08 RX ORDER — TIZANIDINE 4 MG/1
4 TABLET ORAL
COMMUNITY
End: 2020-11-06

## 2020-10-08 RX ORDER — SODIUM CHLORIDE 0.9 % (FLUSH) 0.9 %
5-40 SYRINGE (ML) INJECTION AS NEEDED
Status: DISCONTINUED | OUTPATIENT
Start: 2020-10-08 | End: 2020-10-09 | Stop reason: HOSPADM

## 2020-10-08 RX ORDER — SERTRALINE HYDROCHLORIDE 50 MG/1
100 TABLET, FILM COATED ORAL DAILY
Status: DISCONTINUED | OUTPATIENT
Start: 2020-10-09 | End: 2020-10-09 | Stop reason: HOSPADM

## 2020-10-08 RX ORDER — ACETAMINOPHEN 325 MG/1
650 TABLET ORAL
Status: DISCONTINUED | OUTPATIENT
Start: 2020-10-08 | End: 2020-10-09 | Stop reason: HOSPADM

## 2020-10-08 RX ORDER — ACETAMINOPHEN 650 MG/1
650 SUPPOSITORY RECTAL
Status: DISCONTINUED | OUTPATIENT
Start: 2020-10-08 | End: 2020-10-09 | Stop reason: HOSPADM

## 2020-10-08 RX ORDER — CYCLOBENZAPRINE HCL 10 MG
10 TABLET ORAL
Status: DISCONTINUED | OUTPATIENT
Start: 2020-10-08 | End: 2020-10-09 | Stop reason: HOSPADM

## 2020-10-08 RX ORDER — SODIUM CHLORIDE 9 MG/ML
150 INJECTION, SOLUTION INTRAVENOUS CONTINUOUS
Status: DISCONTINUED | OUTPATIENT
Start: 2020-10-08 | End: 2020-10-09 | Stop reason: HOSPADM

## 2020-10-08 RX ORDER — SILDENAFIL 100 MG/1
100 TABLET, FILM COATED ORAL AS NEEDED
COMMUNITY

## 2020-10-08 RX ORDER — TIZANIDINE 2 MG/1
4 TABLET ORAL
Status: DISCONTINUED | OUTPATIENT
Start: 2020-10-08 | End: 2020-10-09 | Stop reason: HOSPADM

## 2020-10-08 RX ORDER — CYCLOBENZAPRINE HCL 10 MG
10 TABLET ORAL
COMMUNITY
End: 2020-11-06 | Stop reason: DRUGHIGH

## 2020-10-08 RX ORDER — POLYETHYLENE GLYCOL 3350 17 G/17G
17 POWDER, FOR SOLUTION ORAL DAILY PRN
Status: DISCONTINUED | OUTPATIENT
Start: 2020-10-08 | End: 2020-10-09 | Stop reason: HOSPADM

## 2020-10-08 RX ADMIN — SODIUM CHLORIDE 100 ML/HR: 900 INJECTION, SOLUTION INTRAVENOUS at 21:01

## 2020-10-08 RX ADMIN — SODIUM CHLORIDE 1000 ML: 900 INJECTION, SOLUTION INTRAVENOUS at 19:50

## 2020-10-08 NOTE — ED NOTES
5:07 PM  I have evaluated the patient as the Provider in Triage. I have reviewed His vital signs and the triage nurse assessment. I have talked with the patient and any available family and advised that I am the provider in triage and have ordered the appropriate study to initiate their work up based on the clinical presentation during my assessment. I have advised that the patient will be accommodated in the Main ED as soon as possible. I have also requested to contact the triage nurse or myself immediately if the patient experiences any changes in their condition during this brief waiting period. Patient is a 70-year-old male presenting ED for evaluation of dizziness, black stools, and low at home blood pressures. No abdominal pain. On Xarelto. Recent changes in HTN medicines.      RJ Guillermo

## 2020-10-08 NOTE — ED PROVIDER NOTES
VIKTOR Farris is a 64 y.o. male who presents today for dizziness, abdominal pain and dark stools. Patient states that he woke up this AM feeling drowsy and dizzy. At 2pm, after eating soup, his wife took his BP and his systolic BP was in the 46G. He also states that he took his regular Prazosin last night as well as Flexiril and Tinazidine for chronic muscle aches. His abdominal pain has been present for a few days. He describes it as a generalized dull ache. He has felt nauseous or 'sick to his stomach' but no vomiting. He's still taking PO. No difficulty swallowing and no rectal pain. He does endorse chronic loose stools (2x/day) that are normally light brown but his stools have been black for the past 2 days. He is on Xarelto for 2 episodes of LE DVTs in the past.     Of note patient is historically hypertensive however he was having daily headaches. He was taken off Losartan and started on lisinopril in 05/2020. His headaches resolved however his BPs were running low and his PCP wanted him to return to his [place] doctor to d/c the Lisinopril because the [place] manages his BP.        Past Medical History:   Diagnosis Date    Essential hypertension 8/13/2019    Skin tag of perianal region 8/13/2019     Past Surgical History:   Procedure Laterality Date    HX APPENDECTOMY  1975    HX CERVICAL FUSION  2002     Family History:   Problem Relation Age of Onset    Stroke Mother     Prostate Cancer Father      Social History     Socioeconomic History    Marital status:      Spouse name: Not on file    Number of children: Not on file    Years of education: Not on file    Highest education level: Not on file   Occupational History    Not on file   Social Needs    Financial resource strain: Not on file    Food insecurity     Worry: Not on file     Inability: Not on file    Transportation needs     Medical: Not on file     Non-medical: Not on file   Tobacco Use    Smoking status: Never Smoker    Smokeless tobacco: Never Used   Substance and Sexual Activity    Alcohol use: No     Frequency: Never     Comment: quit jan 2019    Drug use: No    Sexual activity: Yes     Partners: Female   Lifestyle    Physical activity     Days per week: Not on file     Minutes per session: Not on file    Stress: Not on file   Relationships    Social connections     Talks on phone: Not on file     Gets together: Not on file     Attends [employer] service: Not on file     Active member of club or organization: Not on file     Attends meetings of clubs or organizations: Not on file     Relationship status: Not on file    Intimate partner violence     Fear of current or ex partner: Not on file     Emotionally abused: Not on file     Physically abused: Not on file     Forced sexual activity: Not on file   Other Topics Concern    Not on file   Social History Narrative    Not on file     ALLERGIES: Pcn [penicillins] and Statins-hmg-coa reductase inhibitors    Review of Systems   Constitutional: Negative for chills, fatigue and fever. Respiratory: Negative for cough and shortness of breath. Cardiovascular: Negative for chest pain. Gastrointestinal: Positive for abdominal pain, blood in stool, diarrhea and nausea. Negative for rectal pain and vomiting. Genitourinary: Negative for difficulty urinating. Musculoskeletal: Negative for gait problem. Skin: Negative for color change. Neurological: Positive for dizziness. Negative for speech difficulty, weakness, light-headedness, numbness and headaches. Psychiatric/Behavioral: Negative. Vitals:    10/08/20 1659   BP: 109/76   Pulse: 96   Resp: 18   Temp: 97.7 °F (36.5 °C)   SpO2: 98%   Weight: 82.3 kg (181 lb 7 oz)   Height: 5' 9\" (1.753 m)          Physical Exam  Constitutional:       General: He is not in acute distress. Appearance: He is well-developed. He is not ill-appearing. HENT:      Head: Normocephalic and atraumatic.       Mouth/Throat:      Mouth: Mucous membranes are moist.   Eyes:      Extraocular Movements: Extraocular movements intact. Cardiovascular:      Rate and Rhythm: Normal rate and regular rhythm. Heart sounds: Normal heart sounds. Pulmonary:      Effort: Pulmonary effort is normal.      Breath sounds: Normal breath sounds. No wheezing. Abdominal:      General: Abdomen is flat. Bowel sounds are normal. There is no distension. Palpations: Abdomen is soft. Tenderness: There is no abdominal tenderness. There is no guarding or rebound. Hernia: No hernia is present. Genitourinary:     Rectum: Normal. No mass or tenderness. Skin:     General: Skin is warm and dry. Neurological:      General: No focal deficit present. Mental Status: He is alert and oriented to person, place, and time. Psychiatric:         Mood and Affect: Mood normal.         Behavior: Behavior normal.        MDM  Number of Diagnoses or Management Options  Abdominal pain, generalized:   Acute kidney injury ([address]):   Hypotension, unspecified hypotension type:   Diagnosis management comments: Burgess Traik LAMBERT is 64 y.o. male who presents with dizziness, abdominal pain and dark stools. CBC with stable Hb, troponin negative   CMP with SHANICE likely 2/2 chronic hypotension and medication induced  No evidence of lower GI bleed on exam, FOBT negative. In setting of SHANICE, CT with contrast not recommended. Treating 1L NS bolus. Will admit to hospitalist for SHANICE, hypotension management. ED EKG interpretation:  Rhythm: normal sinus rhythm; and regular . Rate (approx.): 81; Axis: normal; P wave: normal; QRS interval: normal ; ST/T wave: normal; This EKG was interpreted by Helga Santa MD,ED Provider. Procedures    Perfect Serve Consult for Admission  8:16 PM    ED Room Number: V01/V01  Patient Name and age: Burgess Tarik LAMBERT 64 y.o.  male  Working Diagnosis:   1. Acute kidney injury ([address]    2. Abdominal pain, generalized    3.  Hypotension, unspecified hypotension type      COVID-19 Suspicion:  no  Sepsis present:  no  Reassessment needed: no  Code Status:  Full Code  Readmission: no  Isolation Requirements:  no  Recommended Level of Care:  telemetry  Department:[facility] ED - [phone]  Other:  64 y.o. male who presents with dizziness, abdominal pain and dark stools found to have SHANICE likely 2/2 chronic intermittent hypotension due to medications. FOBT negative.

## 2020-10-08 NOTE — ED TRIAGE NOTES
Patient arrives with complaint of dizziness, weakness, mid-abdominal pain, nausea, and reports \"issues with blood pressure\" for the past two months; stating BP has been running low. States that he woke up this morning feeling dizzy. Reports BP was 85/55 at 1430 at home today, rechecked at 1500 92/56. BP in triage: 109/76    With Eugene Palmama, in triage, patient reports recent black stools.

## 2020-10-09 VITALS
OXYGEN SATURATION: 95 % | RESPIRATION RATE: 16 BRPM | SYSTOLIC BLOOD PRESSURE: 109 MMHG | HEART RATE: 84 BPM | TEMPERATURE: 98.2 F | DIASTOLIC BLOOD PRESSURE: 69 MMHG | BODY MASS INDEX: 26.87 KG/M2 | WEIGHT: 181.44 LBS | HEIGHT: 69 IN

## 2020-10-09 LAB
ABO + RH BLD: NORMAL
ALBUMIN SERPL-MCNC: 3.6 G/DL (ref 3.5–5)
ALBUMIN/GLOB SERPL: 1.1 {RATIO} (ref 1.1–2.2)
ALP SERPL-CCNC: 49 U/L (ref 45–117)
ALT SERPL-CCNC: 53 U/L (ref 12–78)
ANION GAP SERPL CALC-SCNC: 7 MMOL/L (ref 5–15)
ANION GAP SERPL CALC-SCNC: 7 MMOL/L (ref 5–15)
AST SERPL-CCNC: 50 U/L (ref 15–37)
ATRIAL RATE: 81 BPM
BASOPHILS # BLD: 0 K/UL (ref 0–0.1)
BASOPHILS NFR BLD: 1 % (ref 0–1)
BILIRUB SERPL-MCNC: 0.3 MG/DL (ref 0.2–1)
BLOOD GROUP ANTIBODIES SERPL: NORMAL
BUN SERPL-MCNC: 26 MG/DL (ref 6–20)
BUN SERPL-MCNC: 29 MG/DL (ref 6–20)
BUN/CREAT SERPL: 17 (ref 12–20)
BUN/CREAT SERPL: 18 (ref 12–20)
CALCIUM SERPL-MCNC: 8.3 MG/DL (ref 8.5–10.1)
CALCIUM SERPL-MCNC: 8.4 MG/DL (ref 8.5–10.1)
CALCULATED P AXIS, ECG09: 64 DEGREES
CALCULATED R AXIS, ECG10: 66 DEGREES
CALCULATED T AXIS, ECG11: 68 DEGREES
CHLORIDE SERPL-SCNC: 108 MMOL/L (ref 97–108)
CHLORIDE SERPL-SCNC: 109 MMOL/L (ref 97–108)
CO2 SERPL-SCNC: 24 MMOL/L (ref 21–32)
CO2 SERPL-SCNC: 25 MMOL/L (ref 21–32)
CREAT SERPL-MCNC: 1.42 MG/DL (ref 0.7–1.3)
CREAT SERPL-MCNC: 1.67 MG/DL (ref 0.7–1.3)
DIAGNOSIS, 93000: NORMAL
DIFFERENTIAL METHOD BLD: ABNORMAL
EOSINOPHIL # BLD: 0.1 K/UL (ref 0–0.4)
EOSINOPHIL NFR BLD: 1 % (ref 0–7)
ERYTHROCYTE [DISTWIDTH] IN BLOOD BY AUTOMATED COUNT: 15.6 % (ref 11.5–14.5)
GLOBULIN SER CALC-MCNC: 3.2 G/DL (ref 2–4)
GLUCOSE SERPL-MCNC: 87 MG/DL (ref 65–100)
GLUCOSE SERPL-MCNC: 96 MG/DL (ref 65–100)
HCT VFR BLD AUTO: 33.6 % (ref 36.6–50.3)
HGB BLD-MCNC: 11 G/DL (ref 12.1–17)
IMM GRANULOCYTES # BLD AUTO: 0 K/UL (ref 0–0.04)
IMM GRANULOCYTES NFR BLD AUTO: 0 % (ref 0–0.5)
LIPASE SERPL-CCNC: 163 U/L (ref 73–393)
LYMPHOCYTES # BLD: 1.3 K/UL (ref 0.8–3.5)
LYMPHOCYTES NFR BLD: 28 % (ref 12–49)
MAGNESIUM SERPL-MCNC: 2.2 MG/DL (ref 1.6–2.4)
MCH RBC QN AUTO: 29.1 PG (ref 26–34)
MCHC RBC AUTO-ENTMCNC: 32.7 G/DL (ref 30–36.5)
MCV RBC AUTO: 88.9 FL (ref 80–99)
MONOCYTES # BLD: 0.6 K/UL (ref 0–1)
MONOCYTES NFR BLD: 14 % (ref 5–13)
NEUTS SEG # BLD: 2.6 K/UL (ref 1.8–8)
NEUTS SEG NFR BLD: 56 % (ref 32–75)
NRBC # BLD: 0 K/UL (ref 0–0.01)
NRBC BLD-RTO: 0 PER 100 WBC
P-R INTERVAL, ECG05: 174 MS
PHOSPHATE SERPL-MCNC: 3.5 MG/DL (ref 2.6–4.7)
PLATELET # BLD AUTO: 153 K/UL (ref 150–400)
PMV BLD AUTO: 10.8 FL (ref 8.9–12.9)
POTASSIUM SERPL-SCNC: 4 MMOL/L (ref 3.5–5.1)
POTASSIUM SERPL-SCNC: 4.2 MMOL/L (ref 3.5–5.1)
PROT SERPL-MCNC: 6.8 G/DL (ref 6.4–8.2)
Q-T INTERVAL, ECG07: 374 MS
QRS DURATION, ECG06: 88 MS
QTC CALCULATION (BEZET), ECG08: 434 MS
RBC # BLD AUTO: 3.78 M/UL (ref 4.1–5.7)
SODIUM SERPL-SCNC: 140 MMOL/L (ref 136–145)
SODIUM SERPL-SCNC: 140 MMOL/L (ref 136–145)
SPECIMEN EXP DATE BLD: NORMAL
VENTRICULAR RATE, ECG03: 81 BPM
WBC # BLD AUTO: 4.5 K/UL (ref 4.1–11.1)

## 2020-10-09 PROCEDURE — 36415 COLL VENOUS BLD VENIPUNCTURE: CPT

## 2020-10-09 PROCEDURE — 83690 ASSAY OF LIPASE: CPT

## 2020-10-09 PROCEDURE — 83735 ASSAY OF MAGNESIUM: CPT

## 2020-10-09 PROCEDURE — 80053 COMPREHEN METABOLIC PANEL: CPT

## 2020-10-09 PROCEDURE — 74011250637 HC RX REV CODE- 250/637: Performed by: INTERNAL MEDICINE

## 2020-10-09 PROCEDURE — 84100 ASSAY OF PHOSPHORUS: CPT

## 2020-10-09 PROCEDURE — 74011250636 HC RX REV CODE- 250/636: Performed by: INTERNAL MEDICINE

## 2020-10-09 PROCEDURE — 85025 COMPLETE CBC W/AUTO DIFF WBC: CPT

## 2020-10-09 RX ORDER — AMLODIPINE BESYLATE 10 MG/1
10 TABLET ORAL DAILY
Qty: 30 TAB | Refills: 0 | Status: SHIPPED | OUTPATIENT
Start: 2020-10-09 | End: 2020-11-06

## 2020-10-09 RX ADMIN — RIVAROXABAN 10 MG: 10 TABLET, FILM COATED ORAL at 12:37

## 2020-10-09 RX ADMIN — Medication 1 TABLET: at 08:59

## 2020-10-09 RX ADMIN — Medication 10 ML: at 06:32

## 2020-10-09 RX ADMIN — SODIUM CHLORIDE 100 ML/HR: 900 INJECTION, SOLUTION INTRAVENOUS at 00:23

## 2020-10-09 RX ADMIN — SODIUM CHLORIDE 100 ML/HR: 900 INJECTION, SOLUTION INTRAVENOUS at 07:50

## 2020-10-09 RX ADMIN — SERTRALINE HYDROCHLORIDE 100 MG: 50 TABLET ORAL at 08:59

## 2020-10-09 RX ADMIN — Medication 10 ML: at 00:23

## 2020-10-09 NOTE — H&P
[facility]  [address] [place], [facility]  [phone]    Hospitalist Admission Note      NAME:  Gladis Romero II   :   1960   MRN:  [id number]     PCP:  Iggy Kaplan MD     Date of Service/Time:  10/8/2020 10:57 PM         Assessment / Plan:       64 y.o. male with hx of HTN, recurrent VTE presenting with abd pain, weakness, dizziness, found to have SHANICE     SHANICE: suspect from IVVD and prerenal in etiology, as pt was hypotensive. Check UA, urine 'lytes. Hold HCTZ and lisinopril. Would consider using Norvasc as alternative hypertensive agent. Start IVF. Renally dose meds. Avoid nephrotoxins. If no better in the morning then expand workup and consult nephrology. Abdominal pain: CT a/p showed no acute process. Improved, and wants to eat. Supportive care unless pain returns      Essential hypertension: BP controlled. Stop HCTZ and lisinopril and consider using Norvasc as above      DVT, lower extremity: recurrent, 2nd one was unprovoked. Followed by hem/onc who recommended indefinite AC per pt. FOBT negative. Hg WNR. Resume Xarelto; monitor CrCl. Code Status: FULL     Surrogate decision maker: spouse      ED notes, lab results, and imaging studies reviewed.        Total time spent with patient: 79 Minutes   Time spent in the care of this patient included reviewing records, discussing with nursing, obtaining history and examining the patient, and discussing treatment plans, with >50% time spent counseling/coordinating care    Risk of deterioration: High                 Care Plan discussed with: ED provider, Patient, Nursing Staff and >50% of time spent in counseling and coordination of care    Discussed:  Care Plan and D/C Planning    Prophylaxis:  [address]    Disposition:  Home w/Family                 Subjective:     CHIEF COMPLAINT: abdominal pain     HISTORY OF PRESENT ILLNESS:     Mr. Natacha Cage II is a 64 y.o. male w/ hx of  HTN, recurrent VTE presenting with abd pain. Started a few days ago, noting \"dark stools\" however no bright red blood. Has been having issues with BP meds, with PCP recently changing ARB to ACEi due to headaches. Also notes low blood pressures at home causing weakness and dizziness. No fevers or chills. No n/v/d. No cough. ED workup showed SHANICE. Mr. Jian Colvin II is admitted for further evaluation and management. Past Medical History:   Diagnosis Date    DVT, lower extremity ([address]     1 provoked after hemorrhoidectomy, one year later in right LE which was unprovoked    Essential hypertension 8/13/2019    Skin tag of perianal region 8/13/2019        Past Surgical History:   Procedure Laterality Date    HX APPENDECTOMY  1975    HX CERVICAL FUSION  2002       Social History     Tobacco Use    Smoking status: Never Smoker    Smokeless tobacco: Never Used   Substance Use Topics    Alcohol use: No     Frequency: Never     Comment: quit jan 2019        Family History   Problem Relation Age of Onset    Stroke Mother     Prostate Cancer Father         Allergies   Allergen Reactions    Pcn [Penicillins] Hives and Itching    Statins-Hmg-Coa Reductase Inhibitors Other (comments)     \"Causes increased liver enzymes levels\"        Prior to Admission medications    Medication Sig Start Date End Date Taking? Authorizing Provider   rivaroxaban (Xarelto) 10 mg tablet Take 10 mg by mouth daily. Yes Provider, Historical   cyclobenzaprine (FLEXERIL) 10 mg tablet Take 10 mg by mouth two (2) times daily as needed for Muscle Spasm(s). Yes Provider, Historical   tiZANidine (ZANAFLEX) 4 mg tablet Take 4 mg by mouth every six (6) hours as needed for Muscle Spasm(s). Yes Provider, Historical   sildenafil citrate (VIAGRA) 100 mg tablet Take 100 mg by mouth as needed for Erectile Dysfunction. Yes Provider, Historical   lisinopriL (PRINIVIL, ZESTRIL) 40 mg tablet Take 40 mg by mouth daily.    Yes Provider, Historical   sertraline (ZOLOFT) 100 mg tablet TAKE 1 TABLET BY MOUTH EVERY DAY 6/8/20  Yes Cristela Lomeli NP   multivit-min/folic/vit K/lycop (MEN'S 50 PLUS MULTIVITAMIN PO) Take 1 Tab by mouth daily. Yes Provider, Historical   hydroCHLOROthiazide (HYDRODIURIL) 25 mg tablet Take 1 Tab by mouth daily. 2/24/20  Yes Karen Huddleston MD   prazosin (MINIPRESS) 2 mg capsule Take 2 mg by mouth nightly. 11/15/18  Yes Provider, Historical       Review of Systems:  (bold if positive, if negative)    Gen:  fatigueEyes:  ENT:  CVS:  dizzinessPulm:  GI:  Abdominal painGU:  MS:  weaknessSkin:  Psych:  Endo:  Hem:  Renal:  Neuro:            Objective:      VITALS:    Vital signs reviewed; most recent are:    Visit Vitals  /83   Pulse 73   Temp 98.6 °F (37 °C)   Resp 15   Ht 5' 9\" (1.753 m)   Wt 82.3 kg (181 lb 7 oz)   SpO2 96%   BMI 26.79 kg/m²     SpO2 Readings from Last 6 Encounters:   10/08/20 96%   09/09/20 98%   09/02/20 99%   02/24/20 98%   02/18/20 96%   01/13/20 97%            Intake/Output Summary (Last 24 hours) at 10/8/2020 2255  Last data filed at 10/8/2020 2033  Gross per 24 hour   Intake 1000 ml   Output --   Net 1000 ml            Exam:     Physical Exam:    Gen:  Well-developed, well-nourished, in no acute distress, very pleasant  HEENT:  No scleral icterus, hearing intact to voice  Neck:  Supple, without masses  Resp:  No accessory muscle use. CTAB without wheezing, rales, rhonchi  Card: RRR. Normal S1 and S2 without murmurs, rubs, or gallops. No peripheral lower extremity edema. No JVD. Peripheral pulses in tact. Abd:  Normoactive bowel sounds. Soft, non-tender, non-distended. No rebound, no guarding. No appreciable hepatosplenomegaly   Musc:  No cyanosis or clubbing  Skin:  No rashes or ulcers; turgor intact. Neuro:  Cranial nerves are grossly intact, no focal motor weakness, follows commands appropriately  Psych:  Good insight, normal affect. Alert, oriented x 3.  Answers questions appropriately       Labs:    Recent Labs 10/08/20  1710   WBC 6.0   HGB 12.5   HCT 36.5*        Recent Labs     10/08/20  1710   *   K 4.5      CO2 22   *   BUN 32*   CREA 2.54*   CA 9.4   ALB 4.4   ALT 68     No components found for: GLPOC  No results for input(s): PH, PCO2, PO2, HCO3, FIO2 in the last 72 hours. Recent Labs     10/08/20  1710   INR 1.4*     No results found for: SDES  No results found for: CULT  All other current labs reviewed in the computer. Imaging/Studies:    Ct Abd Pelv Wo Cont    Result Date: 10/8/2020  IMPRESSION: No bowel obstruction, ileus or perforation. No intra-abdominal abscess. Imaging personally reviewed.     EKG: NSR, no STT changes  EKG personally reviewed    ___________________________________________________    Attending Physician: Bijal Ybarra MD

## 2020-10-09 NOTE — PROGRESS NOTES
Reason for Admission:   Abdominal Pain, SHANICE                   RUR Score:   16%                  Plan for utilizing home health:    No needs                      Current Advanced Directive/Advance Care Plan: Full, No ACP                         Transition of Care Plan:       I met with the debbie and he stated he lives in a 2 story home with Master room on 1st level with his wife Margaret Pavon [phone]. No prior HH or DME. Patient is independent on all ADL's and drives. Scripts @ [facility]. Plan:  1. Monitor patients response to treatment. 2. No needs. 3. Wife to transport home. 4. Case Management to follow.     Care Management Interventions  PCP Verified by CM: Yes(Dr Brianne Leo)  Mode of Transport at Discharge: Self  Transition of Care Consult (CM Consult): Discharge Planning  MyChart Signup: No  Discharge Durable Medical Equipment: No  Health Maintenance Reviewed: Yes  Physical Therapy Consult: No  Occupational Therapy Consult: No  Speech Therapy Consult: No  Confirm Follow Up Transport: Self  Discharge Location  Discharge Placement: Home  Brent, BS

## 2020-10-09 NOTE — PROGRESS NOTES
0630: Patient has tolerated crackers and clear liquids all night without any abdominal pain. Patient wants to try regular diet for breakfast. RN put in for regular breakfast per doctors orders for advancing diet as tolerated. Will continue to monitor. Bedside and Verbal shift change report given to Antoine Mondragon (oncoming nurse) by Savanah VILLAFANA (offgoing nurse). Report included the following information SBAR, Kardex, Intake/Output, MAR and Recent Results.

## 2020-10-09 NOTE — PROGRESS NOTES
Admission Medication Reconciliation:     Information obtained from:  Patient via interview in ER 17    RxQuery data available¹:  YES    Comments/Recommendations:   Patient able to confirm name, , allergies, and preferred pharmacy  Updated PTA medication list  Last night at 11 pm the patient took cyclobenzaprine, tizanidine, and prazosin. The prazosin is for PTSD. The patient takes cyclobenzaprine and tizanidine on an as needed basis for back pain. When he has back pain he takes both tizanidine and cyclobenzaprine. The patient was switched from losartan to lisinopril three months ago because the losartan caused a headache. The patient feels his blood pressure is low on lisinopril compared with when he was on the losartan. ¹RxQuery pharmacy benefit data reflects medications filled and processed through the patient's insurance, however   this data does NOT capture whether the medication was picked up or is currently being taken by the patient. Prior to Admission Medications   Prescriptions Last Dose Informant Taking? cyclobenzaprine (FLEXERIL) 10 mg tablet 10/7/2020 at 11pm Self Yes   Sig: Take 10 mg by mouth two (2) times daily as needed for Muscle Spasm(s). hydroCHLOROthiazide (HYDRODIURIL) 25 mg tablet 10/8/2020 at noon Self Yes   Sig: Take 1 Tab by mouth daily. lisinopriL (PRINIVIL, ZESTRIL) 40 mg tablet 10/8/2020 at noon Self Yes   Sig: Take 40 mg by mouth daily. multivit-min/folic/vit K/lycop (MEN'S 50 PLUS MULTIVITAMIN PO) 10/7/2020 at Unknown time Self Yes   Sig: Take 1 Tab by mouth daily. prazosin (MINIPRESS) 2 mg capsule 10/7/2020 at 11pm Self Yes   Sig: Take 2 mg by mouth nightly. rivaroxaban (Xarelto) 10 mg tablet 10/8/2020 at 1200 Self Yes   Sig: Take 10 mg by mouth daily.    sertraline (ZOLOFT) 100 mg tablet 10/8/2020 at noon Self Yes   Sig: TAKE 1 TABLET BY MOUTH EVERY DAY   sildenafil citrate (VIAGRA) 100 mg tablet  Self Yes   Sig: Take 100 mg by mouth as needed for Erectile Dysfunction. tiZANidine (ZANAFLEX) 4 mg tablet 10/7/2020 at 11pm Self Yes   Sig: Take 4 mg by mouth every six (6) hours as needed for Muscle Spasm(s). Facility-Administered Medications: None         Please contact the main inpatient pharmacy with any questions or concerns at [phone] and we will direct you to the clinical pharmacist covering this patient's care while in-house.    Wanda Nelson, PharmD, BCPS

## 2020-10-09 NOTE — ROUTINE PROCESS
TRANSFER - OUT REPORT:    Verbal report given to Marcia FREED(name) on Halley Moreno II  being transferred to medical(unit) for routine progression of care       Report consisted of patients Situation, Background, Assessment and   Recommendations(SBAR). Information from the following report(s) SBAR, ED Summary, [facility] and Recent Results was reviewed with the receiving nurse. Lines:   Peripheral IV 10/08/20 Right Antecubital (Active)   Site Assessment Clean, dry, & intact 10/08/20 1713   Phlebitis Assessment 0 10/08/20 1713   Infiltration Assessment 0 10/08/20 1713   Dressing Status Clean, dry, & intact 10/08/20 1713   Dressing Type Transparent 10/08/20 1713   Hub Color/Line Status Pink 10/08/20 1713        Opportunity for questions and clarification was provided.       Patient transported with:   [employer]

## 2020-10-09 NOTE — DISCHARGE INSTRUCTIONS
HOSPITALIST DISCHARGE INSTRUCTIONS  NAME: Frankie Boateng II   :  1960   MRN:  [id number]     Date/Time:  10/9/2020 2:49 PM    ADMIT DATE: 10/8/2020     DISCHARGE DATE: 10/9/2020     ADMITTING DIAGNOSIS:  Acute kidney injury      DISCHARGE DIAGNOSIS:  As above    MEDICATIONS:  1) IF  YOUR BLOOD PRESSURE BEGINS TO TREND UP (SYSTOLIC BLOOD PRESSURE > 140 or DBP > 90) , PLEASE START AMLODIPINE 10mg DAILY AS PRESCRIBED     2) IF YOUR BLOOD PRESSURE IS STILL ELEVATED EVEN AFTER A DAY OF STARTING THE AMLODIPINE, PLEASE RESTART YOUR PRAZOSIN     3) IF YOUR BLOOD PRESSURE IS STILL ELEVATED AFTER A DAY OF AMLODIPINE AND PRAZOSIN, PLEASE RESTART YOUR LISINOPRIL     4) IF YOUR BLOOD PRESSURE IS EVER ABOVE 200 PLEASE CALL YOUR DOCTOR     · It is important that you take the medication exactly as they are prescribed. · Keep your medication in the bottles provided by the pharmacist and keep a list of the medication names, dosages, and times to be taken in your wallet. · Do not take other medications without consulting your doctor. Pain Management: per above medications    What to do at Home    Recommended diet:  Resume previous diet    Recommended activity: Activity as tolerated    If you experience any of the following symptoms then please call your primary care physician or return to the emergency room if you cannot get hold of your doctor:  Fever, chills, nausea, vomiting, diarrhea, change in mentation, falling, bleeding, shortness of breath, chest pain     Follow Up:  Dr. Joe Rizo MD  you are to call and set up an appointment to see them in 2-3 days       Information obtained by :  I understand that if any problems occur once I am at home I am to contact my physician. I understand and acknowledge receipt of the instructions indicated above.                                                                                                                                            Physician's or R.N.'s Signature                                                                  Date/Time                                                                                                                                              Patient or Representative Signature                                                          Date/Time

## 2020-10-13 ENCOUNTER — APPOINTMENT (OUTPATIENT)
Dept: GENERAL RADIOLOGY | Age: 60
End: 2020-10-13
Attending: EMERGENCY MEDICINE
Payer: OTHER GOVERNMENT

## 2020-10-13 ENCOUNTER — HOSPITAL ENCOUNTER (EMERGENCY)
Age: 60
Discharge: HOME | End: 2020-10-13
Attending: EMERGENCY MEDICINE | Admitting: EMERGENCY MEDICINE
Payer: OTHER GOVERNMENT

## 2020-10-13 DIAGNOSIS — R07.9 CHEST PAIN, UNSPECIFIED TYPE: Primary | ICD-10-CM

## 2020-10-13 DIAGNOSIS — Z01.30 BLOOD PRESSURE CHECK: ICD-10-CM

## 2020-10-13 LAB
ALBUMIN SERPL-MCNC: 4.3 G/DL (ref 3.5–5)
ALBUMIN/GLOB SERPL: 1.3 {RATIO} (ref 1.1–2.2)
ALP SERPL-CCNC: 49 U/L (ref 45–117)
ALT SERPL-CCNC: 60 U/L (ref 12–78)
ANION GAP SERPL CALC-SCNC: 7 MMOL/L (ref 5–15)
AST SERPL-CCNC: 81 U/L (ref 15–37)
BASOPHILS # BLD: 0 K/UL (ref 0–0.1)
BASOPHILS NFR BLD: 0 % (ref 0–1)
BILIRUB SERPL-MCNC: 0.3 MG/DL (ref 0.2–1)
BUN SERPL-MCNC: 26 MG/DL (ref 6–20)
BUN/CREAT SERPL: 19 (ref 12–20)
CALCIUM SERPL-MCNC: 9.2 MG/DL (ref 8.5–10.1)
CHLORIDE SERPL-SCNC: 104 MMOL/L (ref 97–108)
CO2 SERPL-SCNC: 25 MMOL/L (ref 21–32)
COMMENT, HOLDF: NORMAL
COMMENT, HOLDF: NORMAL
CREAT SERPL-MCNC: 1.38 MG/DL (ref 0.7–1.3)
DIFFERENTIAL METHOD BLD: ABNORMAL
EOSINOPHIL # BLD: 0.1 K/UL (ref 0–0.4)
EOSINOPHIL NFR BLD: 2 % (ref 0–7)
ERYTHROCYTE [DISTWIDTH] IN BLOOD BY AUTOMATED COUNT: 15.2 % (ref 11.5–14.5)
GLOBULIN SER CALC-MCNC: 3.4 G/DL (ref 2–4)
GLUCOSE SERPL-MCNC: 79 MG/DL (ref 65–100)
HCT VFR BLD AUTO: 36.3 % (ref 36.6–50.3)
HGB BLD-MCNC: 12.3 G/DL (ref 12.1–17)
IMM GRANULOCYTES # BLD AUTO: 0 K/UL (ref 0–0.04)
IMM GRANULOCYTES NFR BLD AUTO: 0 % (ref 0–0.5)
LYMPHOCYTES # BLD: 1.3 K/UL (ref 0.8–3.5)
LYMPHOCYTES NFR BLD: 28 % (ref 12–49)
MCH RBC QN AUTO: 29.6 PG (ref 26–34)
MCHC RBC AUTO-ENTMCNC: 33.9 G/DL (ref 30–36.5)
MCV RBC AUTO: 87.3 FL (ref 80–99)
MONOCYTES # BLD: 0.5 K/UL (ref 0–1)
MONOCYTES NFR BLD: 11 % (ref 5–13)
NEUTS SEG # BLD: 2.6 K/UL (ref 1.8–8)
NEUTS SEG NFR BLD: 59 % (ref 32–75)
NRBC # BLD: 0 K/UL (ref 0–0.01)
NRBC BLD-RTO: 0 PER 100 WBC
PLATELET # BLD AUTO: 196 K/UL (ref 150–400)
PMV BLD AUTO: 10.3 FL (ref 8.9–12.9)
POTASSIUM SERPL-SCNC: 4.2 MMOL/L (ref 3.5–5.1)
PROT SERPL-MCNC: 7.7 G/DL (ref 6.4–8.2)
RBC # BLD AUTO: 4.16 M/UL (ref 4.1–5.7)
SAMPLES BEING HELD,HOLD: NORMAL
SAMPLES BEING HELD,HOLD: NORMAL
SODIUM SERPL-SCNC: 136 MMOL/L (ref 136–145)
TROPONIN I SERPL-MCNC: <0.05 NG/ML
WBC # BLD AUTO: 4.5 K/UL (ref 4.1–11.1)

## 2020-10-13 PROCEDURE — 84484 ASSAY OF TROPONIN QUANT: CPT

## 2020-10-13 PROCEDURE — 99283 EMERGENCY DEPT VISIT LOW MDM: CPT

## 2020-10-13 PROCEDURE — 85025 COMPLETE CBC W/AUTO DIFF WBC: CPT

## 2020-10-13 PROCEDURE — 93005 ELECTROCARDIOGRAM TRACING: CPT

## 2020-10-13 PROCEDURE — 71045 X-RAY EXAM CHEST 1 VIEW: CPT

## 2020-10-13 PROCEDURE — 80053 COMPREHEN METABOLIC PANEL: CPT

## 2020-10-14 VITALS
TEMPERATURE: 98.5 F | SYSTOLIC BLOOD PRESSURE: 115 MMHG | RESPIRATION RATE: 16 BRPM | DIASTOLIC BLOOD PRESSURE: 74 MMHG | HEART RATE: 91 BPM | OXYGEN SATURATION: 98 %

## 2020-10-14 LAB
ATRIAL RATE: 77 BPM
CALCULATED P AXIS, ECG09: 58 DEGREES
CALCULATED R AXIS, ECG10: 64 DEGREES
CALCULATED T AXIS, ECG11: 65 DEGREES
DIAGNOSIS, 93000: NORMAL
P-R INTERVAL, ECG05: 174 MS
Q-T INTERVAL, ECG07: 378 MS
QRS DURATION, ECG06: 88 MS
QTC CALCULATION (BEZET), ECG08: 427 MS
VENTRICULAR RATE, ECG03: 77 BPM

## 2020-10-14 NOTE — ED TRIAGE NOTES
Patient was seen last week at [facility] for hypotension, patient discharged with PRN amlodipine and told to stop taking HCTZ and Lisinopril. PCP at [facility] advised patient to restart lisinopril. Took lisinoril the afternoon, this evening BP 99/74 and advised by PCP to come to ER. Denies any dizziness or lightheaded. BP in triage 117/73.      Also adds he has a \"dull ache in the center of my chest\"

## 2020-10-14 NOTE — DISCHARGE INSTRUCTIONS
Please follow up with Dr Tyler Romo for a blood pressure recheck. Record your blood pressure home until then. Hold off on taking your blood pressure medications until then. Thank you.

## 2020-10-14 NOTE — ED PROVIDER NOTES
Is a 29-year-old male with history of DVT, on Xarelto, hypertension who presents with low blood pressure at home. Patient reports that he was recently admitted for hypotension, SHANICE and the hospitalist told him to stop taking his HCTZ and lisinopril at discharge. He was prescribed amlodipine and told only to take it if his blood pressure is running high. Patient reports that he took it 1 time 2 days ago when his blood pressure was noted to be elevated while watching the football game. Patient reports that the [place] called him today where he follows with a PCP and told him to restart his amlodipine. Patient reported after he took the medication he retook his blood pressure and was noted to be 99/67 prompting ED visit. Patient reports he has no current symptoms at this time. Does notes that at 3 PM when his blood pressure was noted to be low, he was having midsternal chest pain which has now resolved. Notes last stress test was in September at the [place] and it was negative. No prior history of CAD, no family history of CAD.            Past Medical History:   Diagnosis Date    DVT, lower extremity ([address]     1 provoked after hemorrhoidectomy, one year later in right LE which was unprovoked    Essential hypertension 8/13/2019    Skin tag of perianal region 8/13/2019       Past Surgical History:   Procedure Laterality Date    HX APPENDECTOMY  1975    HX CERVICAL FUSION  2002         Family History:   Problem Relation Age of Onset    Stroke Mother     Prostate Cancer Father        Social History     Socioeconomic History    Marital status:      Spouse name: Not on file    Number of children: Not on file    Years of education: Not on file    Highest education level: Not on file   Occupational History    Not on file   Social Needs    Financial resource strain: Not on file    Food insecurity     Worry: Not on file     Inability: Not on file    Transportation needs     Medical: Not on file Non-medical: Not on file   Tobacco Use    Smoking status: Never Smoker    Smokeless tobacco: Never Used   Substance and Sexual Activity    Alcohol use: No     Frequency: Never     Comment: quit jan 2019    Drug use: No    Sexual activity: Yes     Partners: Female   Lifestyle    Physical activity     Days per week: Not on file     Minutes per session: Not on file    Stress: Not on file   Relationships    Social connections     Talks on phone: Not on file     Gets together: Not on file     Attends Anabaptist service: Not on file     Active member of club or organization: Not on file     Attends meetings of clubs or organizations: Not on file     Relationship status: Not on file    Intimate partner violence     Fear of current or ex partner: Not on file     Emotionally abused: Not on file     Physically abused: Not on file     Forced sexual activity: Not on file   Other Topics Concern    Not on file   Social History Narrative    Not on file         ALLERGIES: Pcn [penicillins] and Statins-hmg-coa reductase inhibitors    Review of Systems   Constitutional: Negative for chills and fever. HENT: Negative for drooling and nosebleeds. Eyes: Negative for pain and itching. Respiratory: Negative for choking and stridor. Cardiovascular: Negative for leg swelling. Gastrointestinal: Negative for abdominal pain and rectal pain. Endocrine: Negative for heat intolerance and polyphagia. Genitourinary: Negative for enuresis and genital sores. Musculoskeletal: Negative for arthralgias and joint swelling. Skin: Negative for color change. Allergic/Immunologic: Negative for immunocompromised state. Neurological: Negative for tremors, speech difficulty and light-headedness. Hematological: Negative for adenopathy. Psychiatric/Behavioral: Negative for dysphoric mood and sleep disturbance.        Vitals:    10/13/20 2112 10/13/20 2221 10/13/20 2222 10/13/20 2246   BP: 117/73 116/77  110/74   Pulse: 91 Resp: 16      Temp: 98.5 °F (36.9 °C)      SpO2: 98%  98% 98%            Physical Exam  Vitals signs and nursing note reviewed. Constitutional:       Appearance: He is well-developed. HENT:      Head: Normocephalic and atraumatic. Nose: Nose normal.   Eyes:      Conjunctiva/sclera: Conjunctivae normal.   Neck:      Musculoskeletal: Normal range of motion and neck supple. Cardiovascular:      Rate and Rhythm: Regular rhythm. Heart sounds: Normal heart sounds. Pulmonary:      Effort: Pulmonary effort is normal. No respiratory distress. Abdominal:      General: There is no distension. Palpations: Abdomen is soft. Musculoskeletal: Normal range of motion. General: No deformity. Skin:     General: Skin is warm and dry. Neurological:      Mental Status: He is alert. Coordination: Coordination normal.   Psychiatric:         Behavior: Behavior normal.          MDM  Number of Diagnoses or Management Options  Diagnosis management comments: Pt with stable BP while in the ED. No suspicion for ACS with troponin negative >6 hours after symptom onset. Pt counseled to hold off on BP meds until FU w/PCP later this week. He verbalized understanding. ED EKG interpretation:  Rhythm: normal sinus rhythm; and regular . Rate (approx.): 77; Axis: normal; ST/T wave: normal; No evidence of acute coronary ischemia. Procedures    Patient's results have been reviewed with them. Patient and/or family have verbally conveyed their understanding and agreement of the patient's signs, symptoms, diagnosis, treatment and prognosis and additionally agree to follow up as recommended or return to the Emergency Room should their condition change prior to follow-up.   Discharge instructions have also been provided to the patient with some educational information regarding their diagnosis as well a list of reasons why they would want to return to the ER prior to their follow-up appointment should their condition change.

## 2020-10-15 ENCOUNTER — OFFICE VISIT (OUTPATIENT)
Dept: INTERNAL MEDICINE CLINIC | Age: 60
End: 2020-10-15
Payer: OTHER GOVERNMENT

## 2020-10-15 VITALS
HEART RATE: 78 BPM | RESPIRATION RATE: 16 BRPM | BODY MASS INDEX: 27.13 KG/M2 | DIASTOLIC BLOOD PRESSURE: 80 MMHG | TEMPERATURE: 97.5 F | OXYGEN SATURATION: 97 % | SYSTOLIC BLOOD PRESSURE: 130 MMHG | HEIGHT: 69 IN | WEIGHT: 183.2 LBS

## 2020-10-15 DIAGNOSIS — R79.89 ELEVATED SERUM CREATININE: ICD-10-CM

## 2020-10-15 DIAGNOSIS — I10 ESSENTIAL HYPERTENSION: Primary | ICD-10-CM

## 2020-10-15 DIAGNOSIS — I82.401 RECURRENT ACUTE DEEP VEIN THROMBOSIS (DVT) OF RIGHT LOWER EXTREMITY (HCC): ICD-10-CM

## 2020-10-15 PROCEDURE — 1111F DSCHRG MED/CURRENT MED MERGE: CPT | Performed by: INTERNAL MEDICINE

## 2020-10-15 PROCEDURE — 99496 TRANSJ CARE MGMT HIGH F2F 7D: CPT | Performed by: INTERNAL MEDICINE

## 2020-10-15 RX ORDER — HYDROCHLOROTHIAZIDE 25 MG/1
TABLET ORAL
COMMUNITY
Start: 2020-06-05 | End: 2021-03-25 | Stop reason: SDUPTHER

## 2020-10-15 RX ORDER — LISINOPRIL 40 MG/1
40 TABLET ORAL DAILY
COMMUNITY
End: 2020-11-06

## 2020-10-15 NOTE — PROGRESS NOTES
Chief Complaint   Patient presents with   [facility] Follow Up     low blood pressure     Reviewed record in preparation for visit and have obtained necessary documentation. Identified pt with two pt identifiers(name and ). Health Maintenance Due   Topic    Shingrix Vaccine Age 49> (2 of 2)         Chief Complaint   Patient presents with   [facility] Follow Up     low blood pressure        Wt Readings from Last 3 Encounters:   10/15/20 183 lb 3.2 oz (83.1 kg)   10/08/20 181 lb 7 oz (82.3 kg)   20 182 lb (82.6 kg)     Temp Readings from Last 3 Encounters:   10/15/20 97.5 °F (36.4 °C) (Oral)   10/13/20 98.5 °F (36.9 °C)   10/09/20 98.2 °F (36.8 °C)     BP Readings from Last 3 Encounters:   10/15/20 130/80   10/14/20 115/74   10/09/20 109/69     Pulse Readings from Last 3 Encounters:   10/15/20 78   10/13/20 91   10/09/20 84           Learning Assessment:  :     Learning Assessment 2019   PRIMARY LEARNER Patient   HIGHEST LEVEL OF EDUCATION - PRIMARY LEARNER  > 4 YEARS OF COLLEGE   BARRIERS PRIMARY LEARNER EMOTIONAL   CO-LEARNER CAREGIVER No   PRIMARY LANGUAGE ENGLISH    NEED No   LEARNER PREFERENCE PRIMARY LISTENING   LEARNING SPECIAL TOPICS none   ANSWERED BY patient   RELATIONSHIP SELF       Depression Screening:  :     3 most recent PHQ Screens 2020   PHQ Not Done -   Little interest or pleasure in doing things Not at all   Feeling down, depressed, irritable, or hopeless Not at all   Total Score PHQ 2 0       Fall Risk Assessment:  :     No flowsheet data found. Abuse Screening:  :     Abuse Screening Questionnaire 2019   Do you ever feel afraid of your partner? N   Are you in a relationship with someone who physically or mentally threatens you? N   Is it safe for you to go home? Y       Coordination of Care Questionnaire:  :     1) Have you been to an emergency room, urgent care clinic since your last visit? yes   Hospitalized since your last visit?  yes             2) Have you seen or consulted any other health care providers outside of [address] [place] [address] since your last visit? no  (Include any pap smears or colon screenings in this section.)    3) Do you have an Advance Directive on file? no    4) Are you interested in receiving information on Advance Directives? NO      Patient is accompanied by self I have received verbal consent from Gladis Romero II to discuss any/all medical information while they are present in the room. Reviewed record  In preparation for visit and have obtained necessary documentation.

## 2020-10-15 NOTE — PROGRESS NOTES
Transitional Care Management Progress Note    Patient: Barrett Luo II  : 1960  PCP: Monica Prasad MD    Date of admission: 10/13/20  Date of discharge: 10/13/20    Patient was contacted by Transitional Care Management services within two days after his discharge: Yes. This encounter and supporting documentation was reviewed if available. Medication reconciliation was performed today (10/15/2020). Assessment/Plan:     Diagnoses and all orders for this visit:    1. Essential hypertension - He is not currently taking any medications. Noted lower blood pressure. Given this, I have advised him to continue to hold all blood pressure medications. He will monitor his pressures at home. I will also speak to his  [address]/ CURRENT OUTPATIENT MED LIST  -     FULL CODE    2. Recurrent DVT    3. Elevated serum creatinine    The complexity of medical decision making for this patient's transitional care is high   Follow-up and Dispositions    · Return if symptoms worsen or fail to improve. Subjective: Barrett Luo II is a 64 y.o. male presenting today for follow-up after being discharged from [facility]. The discharge summary was reviewed. The main problem requiring admission was hypotension, acute renal failure. Complications during admission: none    Interval history/Current status: He has stopped all medications given his recent acute renal failure. The issue was thought due to his having a symptomatic hypotension due to a medication change. He has since felt much better. His last creatinine was nearing normal.       Admitting symptoms have: significantly improved      Medications marked \"taking\" at this time:  Home Medications    Medication Sig Start Date End Date Taking?  Authorizing Provider   hydroCHLOROthiazide (HYDRODIURIL) 25 mg tablet TAKE ONE TABLET BY MOUTH ONCE DAILY 20  Yes Provider, Historical   lisinopriL (PRINIVIL, ZESTRIL) 40 mg tablet Take 40 mg by mouth daily. Yes Provider, Historical   amLODIPine (NORVASC) 10 mg tablet Take 1 Tab by mouth daily. 10/9/20  Yes Cara Torres MD   rivaroxaban (Xarelto) 10 mg tablet Take 10 mg by mouth daily. Yes Provider, Historical   sertraline (ZOLOFT) 100 mg tablet TAKE 1 TABLET BY MOUTH EVERY DAY 6/8/20  Yes Keshia Lomeli NP   multivit-min/folic/vit K/lycop (MEN'S 50 PLUS MULTIVITAMIN PO) Take 1 Tab by mouth daily. Yes Provider, Historical   cyclobenzaprine (FLEXERIL) 10 mg tablet Take 10 mg by mouth two (2) times daily as needed for Muscle Spasm(s). Provider, Historical   tiZANidine (ZANAFLEX) 4 mg tablet Take 4 mg by mouth every six (6) hours as needed for Muscle Spasm(s). Provider, Historical   sildenafil citrate (VIAGRA) 100 mg tablet Take 100 mg by mouth as needed for Erectile Dysfunction. Provider, Historical        Review of Systems:  Negative except as per HPI       Patient Active Problem List    Diagnosis Date Noted    [facility]) [facility]     Abdominal pain     Hypotension     DVT, lower extremity ([facility] 02/18/2020    Acute deep vein thrombosis (DVT) of tibial vein of right lower extremity ([facility] 08/14/2019    Skin tag of perianal region 08/13/2019    Essential hypertension 08/13/2019     Current Outpatient Medications   Medication Sig Dispense Refill    hydroCHLOROthiazide (HYDRODIURIL) 25 mg tablet TAKE ONE TABLET BY MOUTH ONCE DAILY      lisinopriL (PRINIVIL, ZESTRIL) 40 mg tablet Take 40 mg by mouth daily.  amLODIPine (NORVASC) 10 mg tablet Take 1 Tab by mouth daily. 30 Tab 0    rivaroxaban (Xarelto) 10 mg tablet Take 10 mg by mouth daily.  sertraline (ZOLOFT) 100 mg tablet TAKE 1 TABLET BY MOUTH EVERY DAY 90 Tab 0    multivit-min/folic/vit K/lycop (MEN'S 50 PLUS MULTIVITAMIN PO) Take 1 Tab by mouth daily.       cyclobenzaprine (FLEXERIL) 10 mg tablet Take 10 mg by mouth two (2) times daily as needed for Muscle Spasm(s).  tiZANidine (ZANAFLEX) 4 mg tablet Take 4 mg by mouth every six (6) hours as needed for Muscle Spasm(s).  sildenafil citrate (VIAGRA) 100 mg tablet Take 100 mg by mouth as needed for Erectile Dysfunction. Allergies   Allergen Reactions    Pcn [Penicillins] Hives and Itching    Statins-Hmg-Coa Reductase Inhibitors Other (comments)     \"Causes increased liver enzymes levels\"     Past Medical History:   Diagnosis Date    DVT, lower extremity ([address]     1 provoked after hemorrhoidectomy, one year later in right LE which was unprovoked    Essential hypertension 8/13/2019    Skin tag of perianal region 8/13/2019     Past Surgical History:   Procedure Laterality Date    HX APPENDECTOMY  1975    HX CERVICAL FUSION  2002     Family History   Problem Relation Age of Onset    Stroke Mother     Prostate Cancer Father      Social History     Tobacco Use    Smoking status: Never Smoker    Smokeless tobacco: Never Used   Substance Use Topics    Alcohol use: No     Frequency: Never     Comment: quit jan 2019          Objective:   /80 (BP 1 Location: Left arm, BP Patient Position: Sitting)   Pulse 78   Temp 97.5 °F (36.4 °C) (Oral)   Resp 16   Ht 5' 9\" (1.753 m)   Wt 183 lb 3.2 oz (83.1 kg)   SpO2 97%   BMI 27.05 kg/m²      Physical Examination: General appearance - alert, well appearing, and in no distress  Chest - clear to auscultation, no wheezes, rales or rhonchi, symmetric air entry  Heart - normal rate, regular rhythm, normal S1, S2, no murmurs, rubs, clicks or gallops  Extremities - peripheral pulses normal, no pedal edema, no clubbing or cyanosis      We discussed the expected course, resolution and complications of the diagnosis(es) in detail. Medication risks, benefits, costs, interactions, and alternatives were discussed as indicated. I advised him to contact the office if his condition worsens, changes or fails to improve as anticipated.  He expressed understanding with the diagnosis(es) and plan.      Emi Wilkins MD

## 2020-10-18 NOTE — DISCHARGE SUMMARY
Physician Discharge Summary     Patient ID:  Livier Dick  [id number]  64 y.o.  1960    Admit date: 10/8/2020    Discharge date and time: 10/9/2020     Admission Diagnoses: Abdominal pain [R10.9]  SHANICE (acute kidney injury) [facility]) [N17.9]    Discharge Diagnoses/Hospital Course   64 y.o. male with hx of HTN, recurrent VTE presenting with abd pain, weakness, dizziness, found to have SHANICE     SHANICE: likely pre-renal in etiology +/- medications +/- hypotension. Rapidly improved with holding HCTZ and ACE I. Also given IVF's      Abdominal pain: CT a/p showed no acute process. Lipase WNL. Resolved.      Essential hypertension: BP's remained low even off all anti-hypertensives and with IVF's. BP at time of discharge was SBP of 109. Suspect will begin to uptrend again. Specific instructions written as to check BP's and when to start amlodipine. Will need close PCP f/u for BP and Cr     DVT, lower extremity: recurrent, 2nd one was unprovoked. Followed by hem/onc who recommended indefinite AC per pt. FOBT negative. Hg WNR. On Xarelto. May need to transition to Eliquis if e/o CKD      PCP: Chris Berman MD     Consults: None    Discharge Exam:  Visit Vitals  /69 (BP 1 Location: Left arm, BP Patient Position: At rest;Supine)   Pulse 84   Temp 98.2 °F (36.8 °C)   Resp 16   Ht 5' 9\" (1.753 m)   Wt 82.3 kg (181 lb 7 oz)   SpO2 95%   BMI 26.79 kg/m²     Gen:  Well-developed, well-nourished, in no acute distress, very pleasant  HEENT:  No scleral icterus, hearing intact to voice  Neck:  Supple, without masses  Resp:  No accessory muscle use. CTAB without wheezing, rales, rhonchi  Card: RRR. Normal S1 and S2 without murmurs, rubs, or gallops. No peripheral lower extremity edema. No JVD. Peripheral pulses in tact. Abd:  Normoactive bowel sounds. Soft, non-tender, non-distended. No rebound, no guarding. No appreciable hepatosplenomegaly   Musc:  No cyanosis or clubbing  Skin:  No rashes or ulcers; turgor intact. Neuro:  Cranial nerves are grossly intact, no focal motor weakness, follows commands appropriately  Psych:  Good insight, normal affect. Alert, oriented x 3. Answers questions appropriately     Disposition: home    Patient Instructions:   Discharge Medication List as of 10/9/2020  5:52 PM      START taking these medications    Details   amLODIPine (NORVASC) 10 mg tablet Take 1 Tab by mouth daily. , Print, Disp-30 Tab,R-0         CONTINUE these medications which have NOT CHANGED    Details   rivaroxaban (Xarelto) 10 mg tablet Take 10 mg by mouth daily. , Historical Med      cyclobenzaprine (FLEXERIL) 10 mg tablet Take 10 mg by mouth two (2) times daily as needed for Muscle Spasm(s). , Historical Med      tiZANidine (ZANAFLEX) 4 mg tablet Take 4 mg by mouth every six (6) hours as needed for Muscle Spasm(s). , Historical Med      sildenafil citrate (VIAGRA) 100 mg tablet Take 100 mg by mouth as needed for Erectile Dysfunction. , Historical Med      sertraline (ZOLOFT) 100 mg tablet TAKE 1 TABLET BY MOUTH EVERY DAY, Normal, Disp-90 Tab,R-0      multivit-min/folic/vit K/lycop (MEN'S 50 PLUS MULTIVITAMIN PO) Take 1 Tab by mouth daily. , Historical Med         STOP taking these medications       lisinopriL (PRINIVIL, ZESTRIL) 40 mg tablet Comments:   Reason for Stopping:         hydroCHLOROthiazide (HYDRODIURIL) 25 mg tablet Comments:   Reason for Stopping:         prazosin (MINIPRESS) 2 mg capsule Comments:   Reason for Stoppin) IF  YOUR BLOOD PRESSURE BEGINS TO TREND UP (SYSTOLIC BLOOD PRESSURE > 140 or DBP > 90) , PLEASE START AMLODIPINE 10mg DAILY AS PRESCRIBED     2) IF YOUR BLOOD PRESSURE IS STILL ELEVATED EVEN AFTER A DAY OF STARTING THE AMLODIPINE, PLEASE RESTART YOUR PRAZOSIN     3) IF YOUR BLOOD PRESSURE IS STILL ELEVATED AFTER A DAY OF AMLODIPINE AND PRAZOSIN, PLEASE RESTART YOUR LISINOPRIL     4) IF YOUR BLOOD PRESSURE IS EVER ABOVE 200 PLEASE CALL YOUR DOCTOR       Activity: Activity as tolerated  Diet: Resume previous diet  Wound Care: None needed    Follow-up with Daryle Gambles, MD   Follow-up Information     Follow up With Specialties Details Why Contact Info    Daryle Gambles, MD Internal Medicine   [address] [place] [address]  [address] [facility]  [phone]            Approximate time spent in patient care on day of discharge: 35 minutes     Signed:  Lelo Rivera MD  10/18/2020  2:39 PM

## 2020-11-02 ENCOUNTER — TELEPHONE (OUTPATIENT)
Dept: ONCOLOGY | Age: 60
End: 2020-11-02

## 2020-11-03 ENCOUNTER — TELEPHONE (OUTPATIENT)
Dept: ONCOLOGY | Age: 60
End: 2020-11-03

## 2020-11-03 NOTE — TELEPHONE ENCOUNTER
Called patient and confirmed x2 identifiers   Reviewed previous LIFT12hart message with patient   Patient denies shortness of breath at this time and pain is currently managed   Informed patient if shortness of breath occurs or pain worsens, to go to ER to be assessed   Patient verbalized understanding     Patient confirmed missing GI appointment and did not remember MD he saw; informed patient he saw MD Madelyn Casas and provided number to call     Informed patient follow-up to be 12 months from last appointment   Patient agreed with plan and transferred to front to schedule

## 2020-11-06 ENCOUNTER — OFFICE VISIT (OUTPATIENT)
Dept: INTERNAL MEDICINE CLINIC | Age: 60
End: 2020-11-06
Payer: OTHER GOVERNMENT

## 2020-11-06 VITALS
RESPIRATION RATE: 16 BRPM | SYSTOLIC BLOOD PRESSURE: 114 MMHG | HEIGHT: 69 IN | WEIGHT: 184 LBS | DIASTOLIC BLOOD PRESSURE: 78 MMHG | TEMPERATURE: 97.5 F | BODY MASS INDEX: 27.25 KG/M2 | OXYGEN SATURATION: 97 % | HEART RATE: 81 BPM

## 2020-11-06 DIAGNOSIS — R10.12 LEFT UPPER QUADRANT ABDOMINAL PAIN: ICD-10-CM

## 2020-11-06 DIAGNOSIS — I10 ESSENTIAL HYPERTENSION: Primary | ICD-10-CM

## 2020-11-06 PROCEDURE — 99214 OFFICE O/P EST MOD 30 MIN: CPT | Performed by: INTERNAL MEDICINE

## 2020-11-06 RX ORDER — LOSARTAN POTASSIUM 25 MG/1
12.5 TABLET ORAL DAILY
Qty: 30 TAB | Refills: 0
Start: 2020-11-06 | End: 2022-02-08

## 2020-11-06 RX ORDER — PRAZOSIN HYDROCHLORIDE 2 MG/1
CAPSULE ORAL
COMMUNITY
Start: 2020-06-05

## 2020-11-06 NOTE — PROGRESS NOTES
Acute Care Note    Óscar Damon II is 64 y.o. male. he presents for evaluation of Rib Pain (Left )    He has been on HCTZ and Losartan. Pain under lower left ribcage. Sharp. Not today. Happened last week. Off an on. He has not had any change in bowel habit. He has seen GI in the past.  We discussed that given the present symptoms, I suggest he follow up again. Prior to Admission medications    Medication Sig Start Date End Date Taking? Authorizing Provider   acetaminophen (TYLENOL 8 HOUR PO) Take  by mouth. Yes Provider, Historical   prazosin (MINIPRESS) 2 mg capsule TAKE 1 CAPSULE BY MOUTH AT BEDTIME CAUTION: THIS DRUG MAY INCREASE RISK FOR FALLS 6/5/20  Yes Provider, Historical   hydroCHLOROthiazide (HYDRODIURIL) 25 mg tablet TAKE ONE TABLET BY MOUTH ONCE DAILY 6/5/20  Yes Provider, Historical   rivaroxaban (Xarelto) 10 mg tablet Take 10 mg by mouth daily. Yes Provider, Historical   sildenafil citrate (VIAGRA) 100 mg tablet Take 100 mg by mouth as needed for Erectile Dysfunction. Yes Provider, Historical   sertraline (ZOLOFT) 100 mg tablet TAKE 1 TABLET BY MOUTH EVERY DAY 6/8/20  Yes Jude Lmoeli NP   multivit-min/folic/vit K/lycop (MEN'S 50 PLUS MULTIVITAMIN PO) Take 1 Tab by mouth daily. Yes Provider, Historical   lisinopriL (PRINIVIL, ZESTRIL) 40 mg tablet Take 40 mg by mouth daily. Provider, Historical   amLODIPine (NORVASC) 10 mg tablet Take 1 Tab by mouth daily. 10/9/20   Aly Diaz MD   cyclobenzaprine (FLEXERIL) 10 mg tablet Take 10 mg by mouth two (2) times daily as needed for Muscle Spasm(s). Provider, Historical   tiZANidine (ZANAFLEX) 4 mg tablet Take 4 mg by mouth every six (6) hours as needed for Muscle Spasm(s).     Provider, Historical         Patient Active Problem List   Diagnosis Code    Skin tag of perianal region K64.4    Essential hypertension I10    Acute deep vein thrombosis (DVT) of tibial vein of right lower extremity (HCC) I82.441  DVT, lower extremity (HCC) I82.409    SHANICE (acute kidney injury) ([facility] [address] N17.9    Abdominal pain R10.9    Hypotension I95.9         Review of Systems   Constitutional: Negative. Gastrointestinal: Positive for abdominal pain. Negative for diarrhea and vomiting. Visit Vitals  /78   Pulse 81   Temp 97.5 °F (36.4 °C) (Temporal)   Resp 16   Ht 5' 9\" (1.753 m)   Wt 184 lb (83.5 kg)   SpO2 97%   BMI 27.17 kg/m²       Physical Exam  Constitutional:       Appearance: He is well-developed. Cardiovascular:      Rate and Rhythm: Normal rate and regular rhythm. Pulmonary:      Effort: Pulmonary effort is normal.      Breath sounds: Normal breath sounds. ASSESSMENT/PLAN  Diagnoses and all orders for this visit:    1. Essential hypertension  -     losartan (COZAAR) 25 mg tablet; Take 0.5 Tabs by mouth daily. 2. Left upper quadrant abdominal pain - He does not have the pain today. He will call to schedule with GI. Advised the patient to call back or return to office if symptoms worsen/change/persist.   Discussed expected course/resolution/complications of diagnosis in detail with patient. Medication risks/benefits/costs/interactions/alternatives discussed with patient. The patient was given an after visit summary which includes diagnoses, current medications, & vitals. They expressed understanding with the diagnosis and plan.

## 2020-11-06 NOTE — PATIENT INSTRUCTIONS
Please  [facility] OTC--You can begin taking this according to the package directions. Call and set up an appointment with Dr. Yani Dawson as well.

## 2020-12-03 NOTE — TELEPHONE ENCOUNTER
Requested Prescriptions     Pending Prescriptions Disp Refills    sertraline (ZOLOFT) 100 mg tablet 90 Tab 0       [address], [place] - [address] [facility]  [phone]

## 2020-12-10 RX ORDER — SERTRALINE HYDROCHLORIDE 100 MG/1
TABLET, FILM COATED ORAL
Qty: 90 TAB | Refills: 1 | Status: SHIPPED | COMMUNITY
Start: 2020-12-10 | End: 2022-09-07 | Stop reason: SDUPTHER

## 2020-12-10 RX ORDER — SERTRALINE HYDROCHLORIDE 100 MG/1
TABLET, FILM COATED ORAL
Qty: 90 TAB | Refills: 1 | Status: SHIPPED | OUTPATIENT
Start: 2020-12-10 | End: 2020-12-10 | Stop reason: SDUPTHER

## 2021-02-04 ENCOUNTER — TRANSCRIBE ORDER (OUTPATIENT)
Dept: SCHEDULING | Age: 61
End: 2021-02-04

## 2021-02-04 ENCOUNTER — DOCUMENTATION ONLY (OUTPATIENT)
Dept: ONCOLOGY | Age: 61
End: 2021-02-04

## 2021-02-04 DIAGNOSIS — K86.2 PANCREATIC CYST: ICD-10-CM

## 2021-02-04 DIAGNOSIS — R93.5 ABNORMAL CT OF THE ABDOMEN: ICD-10-CM

## 2021-02-04 DIAGNOSIS — K92.1 MELENA: ICD-10-CM

## 2021-02-04 DIAGNOSIS — Z79.1 NSAID LONG-TERM USE: Primary | ICD-10-CM

## 2021-03-25 ENCOUNTER — TELEPHONE (OUTPATIENT)
Dept: INTERNAL MEDICINE CLINIC | Age: 61
End: 2021-03-25

## 2021-03-25 RX ORDER — HYDROCHLOROTHIAZIDE 25 MG/1
TABLET ORAL
Qty: 90 TAB | Refills: 0 | Status: SHIPPED | COMMUNITY
Start: 2021-03-25

## 2021-03-25 RX ORDER — HYDROCHLOROTHIAZIDE 25 MG/1
TABLET ORAL
Qty: 30 TAB | Refills: 0 | Status: SHIPPED | OUTPATIENT
Start: 2021-03-25 | End: 2021-03-25 | Stop reason: SDUPTHER

## 2021-03-30 RX ORDER — HYDROCHLOROTHIAZIDE 25 MG/1
TABLET ORAL
Qty: 90 TAB | Refills: 0 | OUTPATIENT
Start: 2021-03-30

## 2022-01-25 DIAGNOSIS — I10 ESSENTIAL HYPERTENSION: ICD-10-CM

## 2022-01-26 RX ORDER — LOSARTAN POTASSIUM 100 MG/1
TABLET ORAL
Qty: 30 TABLET | Refills: 0 | Status: SHIPPED | OUTPATIENT
Start: 2022-01-26

## 2022-02-01 ENCOUNTER — OFFICE VISIT (OUTPATIENT)
Dept: INTERNAL MEDICINE CLINIC | Age: 62
End: 2022-02-01
Payer: OTHER GOVERNMENT

## 2022-02-01 VITALS
DIASTOLIC BLOOD PRESSURE: 84 MMHG | HEART RATE: 84 BPM | WEIGHT: 182.4 LBS | HEIGHT: 69 IN | BODY MASS INDEX: 27.02 KG/M2 | TEMPERATURE: 98.2 F | RESPIRATION RATE: 16 BRPM | OXYGEN SATURATION: 96 % | SYSTOLIC BLOOD PRESSURE: 120 MMHG

## 2022-02-01 DIAGNOSIS — Z00.00 WELL ADULT EXAM: Primary | ICD-10-CM

## 2022-02-01 DIAGNOSIS — Z12.5 PROSTATE CANCER SCREENING: ICD-10-CM

## 2022-02-01 DIAGNOSIS — Z00.00 WELL ADULT EXAM: ICD-10-CM

## 2022-02-01 DIAGNOSIS — F32.89 OTHER DEPRESSION: ICD-10-CM

## 2022-02-01 DIAGNOSIS — I10 ESSENTIAL HYPERTENSION: ICD-10-CM

## 2022-02-01 DIAGNOSIS — E78.2 MIXED HYPERLIPIDEMIA: ICD-10-CM

## 2022-02-01 PROCEDURE — 99396 PREV VISIT EST AGE 40-64: CPT | Performed by: INTERNAL MEDICINE

## 2022-02-01 NOTE — PROGRESS NOTES
Comprehensive Physical Examination    Heather Hahn II is a 63 y.o. male. he presents for a comprehensive physical examination. Cardiovascular Review  The patient has hypertension. He reports taking medications as instructed, no medication side effects noted. Diet and Lifestyle: generally follows a low fat low cholesterol diet, generally follows a low sodium diet, exercises sporadically, nonsmoker. Lab review: orders written for new lab studies as appropriate; see orders. He was diagnosed with fatty liver last year. Changed diet, no discomfort. He also was noted to have a pancreatic cyst which was normal.      He has not been running as much as he has in the past due to the cold weather. He will work on exercising more    He has noted some hearing loss. Discussed that he wanted some additional evaluation. He has [facility] benefits but will follow up with an outside ENT. Reviewed health maintenance. We will place orders as needed. Patient Active Problem List    Diagnosis Date Noted    SHANICE (acute kidney injury) ([facility] [address]     Abdominal pain     Hypotension     DVT, lower extremity ([facility] [address] 02/18/2020    Acute deep vein thrombosis (DVT) of tibial vein of right lower extremity ([facility] [address] 08/14/2019    Skin tag of perianal region 08/13/2019    Essential hypertension 08/13/2019     Current Outpatient Medications   Medication Sig Dispense Refill    losartan (COZAAR) 100 mg tablet TAKE 1 TABLET BY MOUTH DAILY 30 Tablet 0    hydroCHLOROthiazide (HYDRODIURIL) 25 mg tablet TAKE ONE TABLET BY MOUTH ONCE DAILY 90 Tab 0    sertraline (ZOLOFT) 100 mg tablet TAKE 1 TABLET BY MOUTH EVERY DAY 90 Tab 1    acetaminophen (TYLENOL 8 HOUR PO) Take  by mouth.  prazosin (MINIPRESS) 2 mg capsule TAKE 1 CAPSULE BY MOUTH AT BEDTIME CAUTION: THIS DRUG MAY INCREASE RISK FOR FALLS      rivaroxaban (Xarelto) 10 mg tablet Take 10 mg by mouth daily.       sildenafil citrate (VIAGRA) 100 mg tablet Take 100 mg by mouth as needed for Erectile Dysfunction.  multivit-min/folic/vit K/lycop (MEN'S 50 PLUS MULTIVITAMIN PO) Take 1 Tab by mouth daily.  losartan (COZAAR) 25 mg tablet Take 0.5 Tabs by mouth daily. 30 Tab 0     Allergies   Allergen Reactions    Pcn [Penicillins] Hives and Itching    Statins-Hmg-Coa Reductase Inhibitors Other (comments)     \"Causes increased liver enzymes levels\"     Past Medical History:   Diagnosis Date    DVT, lower extremity ([address]     1 provoked after hemorrhoidectomy, one year later in right LE which was unprovoked    Essential hypertension 8/13/2019    Skin tag of perianal region 8/13/2019     Past Surgical History:   Procedure Laterality Date    HX APPENDECTOMY  1975    HX CERVICAL FUSION  2002     Family History   Problem Relation Age of Onset    Stroke Mother     Prostate Cancer Father      Social History     Tobacco Use    Smoking status: Never Smoker    Smokeless tobacco: Never Used   Substance Use Topics    Alcohol use: No     Comment: quit jan 2019        Health Maintenance   Topic Date Due    Shingrix Vaccine Age 50> (2 of 2) 02/14/2019    Flu Vaccine (1) 09/01/2021    Depression Monitoring  09/02/2021    COVID-19 Vaccine (3 - Booster for Pfizer series) 09/23/2021    Colorectal Cancer Screening Combo  10/08/2021    Lipid Screen  02/24/2025    DTaP/Tdap/Td series (2 - Td or Tdap) 12/20/2028    Hepatitis C Screening  Completed    Pneumococcal 0-64 years  Aged Out         Review of Systems   Constitutional: Negative. Respiratory: Negative. Cardiovascular: Negative. Gastrointestinal: Negative. Visit Vitals  /84 (BP 1 Location: Left arm, BP Patient Position: Sitting, BP Cuff Size: Adult)   Pulse 84   Temp 98.2 °F (36.8 °C) (Temporal)   Resp 16   Ht 5' 9\" (1.753 m)   Wt 182 lb 6.4 oz (82.7 kg)   SpO2 96%   BMI 26.94 kg/m²       Physical Exam  Constitutional:       Appearance: Normal appearance.    Cardiovascular:      Rate and Rhythm: Normal rate and regular rhythm. Pulmonary:      Effort: Pulmonary effort is normal.      Breath sounds: Normal breath sounds. Neurological:      Mental Status: He is alert. ASSESSMENT/PLAN  Diagnoses and all orders for this visit:    1. Well adult exam    2. Essential hypertension - Pressure controlled at this time. Follow up lab results  -     METABOLIC PANEL, COMPREHENSIVE; Future  -     CBC WITH AUTOMATED DIFF; Future  -     URINALYSIS W/ RFLX MICROSCOPIC; Future    3. Other depression    4. Mixed hyperlipidemia Check lipids  -     LIPID PANEL; Future    5. Prostate cancer screening  -     PSA SCREENING (SCREENING); Future      Follow-up and Dispositions    · Return in about 6 months (around 8/1/2022) for Follow up.

## 2022-02-01 NOTE — PROGRESS NOTES
Chief Complaint   Patient presents with    Complete Physical     Reviewed record in preparation for visit and have obtained necessary documentation. Identified pt with two pt identifiers(name and ). Health Maintenance Due   Topic    Shingrix Vaccine Age 49> (2 of 2)    Flu Vaccine (1)    Depression Monitoring     COVID-19 Vaccine (3 - Booster for Pfizer series)    Colorectal Cancer Screening Combo          Chief Complaint   Patient presents with    Complete Physical        Wt Readings from Last 3 Encounters:   22 182 lb 6.4 oz (82.7 kg)   20 184 lb (83.5 kg)   10/15/20 183 lb 3.2 oz (83.1 kg)     Temp Readings from Last 3 Encounters:   22 98.2 °F (36.8 °C) (Temporal)   20 97.5 °F (36.4 °C) (Temporal)   10/15/20 97.5 °F (36.4 °C) (Oral)     BP Readings from Last 3 Encounters:   22 120/84   20 114/78   10/15/20 130/80     Pulse Readings from Last 3 Encounters:   22 84   20 81   10/15/20 78           Learning Assessment:  :     Learning Assessment 2019   PRIMARY LEARNER Patient   HIGHEST LEVEL OF EDUCATION - PRIMARY LEARNER  > 4 YEARS OF COLLEGE   BARRIERS PRIMARY LEARNER EMOTIONAL   CO-LEARNER CAREGIVER No   PRIMARY LANGUAGE ENGLISH    NEED No   LEARNER PREFERENCE PRIMARY LISTENING   LEARNING SPECIAL TOPICS none   ANSWERED BY patient   RELATIONSHIP SELF       Depression Screening:  :     3 most recent PHQ Screens 2022   PHQ Not Done -   Little interest or pleasure in doing things Not at all   Feeling down, depressed, irritable, or hopeless Not at all   Total Score PHQ 2 0       Fall Risk Assessment:  :     No flowsheet data found. Abuse Screening:  :     Abuse Screening Questionnaire 2019   Do you ever feel afraid of your partner? N   Are you in a relationship with someone who physically or mentally threatens you? N   Is it safe for you to go home?  Y       Coordination of Care Questionnaire:  :     1) Have you been to an emergency room, urgent care clinic since your last visit? no  Hospitalized since your last visit? no             2) Have you seen or consulted any other health care providers outside of [address] [place] [address] since your last visit? no  (Include any pap smears or colon screenings in this section.)    3) Do you have an Advance Directive on file? no    4) Are you interested in receiving information on Advance Directives? NO      Patient is accompanied by self I have received verbal consent from Germania Joy II to discuss any/all medical information while they are present in the room. Reviewed record  In preparation for visit and have obtained necessary documentation.

## 2022-02-02 LAB
ALBUMIN SERPL-MCNC: 4.5 G/DL (ref 3.5–5)
ALBUMIN/GLOB SERPL: 1.4 {RATIO} (ref 1.1–2.2)
ALP SERPL-CCNC: 61 U/L (ref 45–117)
ALT SERPL-CCNC: 111 U/L (ref 12–78)
ANION GAP SERPL CALC-SCNC: 4 MMOL/L (ref 5–15)
APPEARANCE UR: CLEAR
AST SERPL-CCNC: 114 U/L (ref 15–37)
BASOPHILS # BLD: 0 K/UL (ref 0–0.1)
BASOPHILS NFR BLD: 1 % (ref 0–1)
BILIRUB SERPL-MCNC: 0.4 MG/DL (ref 0.2–1)
BILIRUB UR QL: NEGATIVE
BUN SERPL-MCNC: 16 MG/DL (ref 6–20)
BUN/CREAT SERPL: 18 (ref 12–20)
CALCIUM SERPL-MCNC: 9.8 MG/DL (ref 8.5–10.1)
CHLORIDE SERPL-SCNC: 105 MMOL/L (ref 97–108)
CHOLEST SERPL-MCNC: 200 MG/DL
CO2 SERPL-SCNC: 30 MMOL/L (ref 21–32)
COLOR UR: NORMAL
CREAT SERPL-MCNC: 0.91 MG/DL (ref 0.7–1.3)
DIFFERENTIAL METHOD BLD: ABNORMAL
EOSINOPHIL # BLD: 0.1 K/UL (ref 0–0.4)
EOSINOPHIL NFR BLD: 2 % (ref 0–7)
ERYTHROCYTE [DISTWIDTH] IN BLOOD BY AUTOMATED COUNT: 15.4 % (ref 11.5–14.5)
GLOBULIN SER CALC-MCNC: 3.2 G/DL (ref 2–4)
GLUCOSE SERPL-MCNC: 100 MG/DL (ref 65–100)
GLUCOSE UR STRIP.AUTO-MCNC: NEGATIVE MG/DL
HCT VFR BLD AUTO: 43.6 % (ref 36.6–50.3)
HDLC SERPL-MCNC: 31 MG/DL
HDLC SERPL: 6.5 {RATIO} (ref 0–5)
HGB BLD-MCNC: 14.1 G/DL (ref 12.1–17)
HGB UR QL STRIP: NEGATIVE
IMM GRANULOCYTES # BLD AUTO: 0 K/UL (ref 0–0.04)
IMM GRANULOCYTES NFR BLD AUTO: 1 % (ref 0–0.5)
KETONES UR QL STRIP.AUTO: NEGATIVE MG/DL
LDLC SERPL CALC-MCNC: 113 MG/DL (ref 0–100)
LEUKOCYTE ESTERASE UR QL STRIP.AUTO: NEGATIVE
LYMPHOCYTES # BLD: 1.2 K/UL (ref 0.8–3.5)
LYMPHOCYTES NFR BLD: 33 % (ref 12–49)
MCH RBC QN AUTO: 29.2 PG (ref 26–34)
MCHC RBC AUTO-ENTMCNC: 32.3 G/DL (ref 30–36.5)
MCV RBC AUTO: 90.3 FL (ref 80–99)
MONOCYTES # BLD: 0.5 K/UL (ref 0–1)
MONOCYTES NFR BLD: 13 % (ref 5–13)
NEUTS SEG # BLD: 1.9 K/UL (ref 1.8–8)
NEUTS SEG NFR BLD: 50 % (ref 32–75)
NITRITE UR QL STRIP.AUTO: NEGATIVE
NRBC # BLD: 0 K/UL (ref 0–0.01)
NRBC BLD-RTO: 0 PER 100 WBC
PH UR STRIP: 6.5 [PH] (ref 5–8)
PLATELET # BLD AUTO: 178 K/UL (ref 150–400)
PMV BLD AUTO: 10.9 FL (ref 8.9–12.9)
POTASSIUM SERPL-SCNC: 4.1 MMOL/L (ref 3.5–5.1)
PROT SERPL-MCNC: 7.7 G/DL (ref 6.4–8.2)
PROT UR STRIP-MCNC: NEGATIVE MG/DL
PSA SERPL-MCNC: 0.7 NG/ML (ref 0.01–4)
RBC # BLD AUTO: 4.83 M/UL (ref 4.1–5.7)
SODIUM SERPL-SCNC: 139 MMOL/L (ref 136–145)
SP GR UR REFRACTOMETRY: 1.02 (ref 1–1.03)
TRIGL SERPL-MCNC: 280 MG/DL (ref ?–150)
UROBILINOGEN UR QL STRIP.AUTO: 0.2 EU/DL (ref 0.2–1)
VLDLC SERPL CALC-MCNC: 56 MG/DL
WBC # BLD AUTO: 3.7 K/UL (ref 4.1–11.1)

## 2022-02-15 NOTE — PROGRESS NOTES
Please call the patient. He has some elevated liver enzymes on testing. Is he taking tylenol? Please have him confirm all of his medications (make sure he does not have any that are prescribed by the [place] that I don't know about) and update his list.   I would like him to repeat his LFT's in about a week. If ongoing elevation, he will need an US of the liver. Pls order CMP and GGT    The rest of his labs look pretty good except the cholesterol. He needs to work on improving intake of sugars and fatty/fried foods.

## 2022-03-19 PROBLEM — I82.409 DVT, LOWER EXTREMITY (HCC): Status: ACTIVE | Noted: 2020-02-18

## 2022-03-19 PROBLEM — I82.441 ACUTE DEEP VEIN THROMBOSIS (DVT) OF TIBIAL VEIN OF RIGHT LOWER EXTREMITY (HCC): Status: ACTIVE | Noted: 2019-08-14

## 2022-03-20 PROBLEM — I10 ESSENTIAL HYPERTENSION: Status: ACTIVE | Noted: 2019-08-13

## 2022-03-20 PROBLEM — K64.4 SKIN TAG OF PERIANAL REGION: Status: ACTIVE | Noted: 2019-08-13

## 2022-09-07 NOTE — TELEPHONE ENCOUNTER
Requested Prescriptions     Pending Prescriptions Disp Refills    sertraline (ZOLOFT) 100 mg tablet 90 Tablet 1     Sig: TAKE 1 TABLET BY MOUTH EVERY DAY     [id number]- [facility], [place] - [address] [place] [address]  [phone]

## 2022-09-07 NOTE — TELEPHONE ENCOUNTER
Pt last seen on 2/1/22. Due to return in 6 months. No appt scheduled at this time. Will forward to front office pool to call pt and schedule. Will forward to MD to fill.

## 2022-09-08 RX ORDER — SERTRALINE HYDROCHLORIDE 100 MG/1
TABLET, FILM COATED ORAL
Qty: 30 TABLET | Refills: 1 | Status: SHIPPED | OUTPATIENT
Start: 2022-09-08

## 2022-11-29 ENCOUNTER — TELEPHONE (OUTPATIENT)
Dept: INTERNAL MEDICINE CLINIC | Age: 62
End: 2022-11-29

## 2022-11-29 NOTE — TELEPHONE ENCOUNTER
Reason for call:    Patient would like a copy of his last physical mailed to him. He would also like a letter from Dr. Cabrera Charles stating that he is in good health. He would like this because he is trying to re-establish with the [facility].     Is this a new problem: yes     Date of last appointment:  9/14/2022     Can we respond via [employer]: no    Best call back number:     Rosalynd Lázaro - [phone]

## 2023-05-24 RX ORDER — HYDROCHLOROTHIAZIDE 25 MG/1
TABLET ORAL
COMMUNITY
Start: 2023-01-17

## 2023-05-24 RX ORDER — PRAZOSIN HYDROCHLORIDE 2 MG/1
CAPSULE ORAL
COMMUNITY
Start: 2020-06-05

## 2023-05-24 RX ORDER — SILDENAFIL 100 MG/1
100 TABLET, FILM COATED ORAL PRN
COMMUNITY

## 2023-05-24 RX ORDER — LOSARTAN POTASSIUM 100 MG/1
100 TABLET ORAL DAILY
COMMUNITY
Start: 2023-01-17

## 2023-05-24 RX ORDER — SERTRALINE HYDROCHLORIDE 100 MG/1
1 TABLET, FILM COATED ORAL DAILY
COMMUNITY
Start: 2023-01-17

## 2024-06-18 NOTE — TELEPHONE ENCOUNTER
"Patient Education   The [facility]  What to Expect  Here's what to expect in the Panel Psychiatry Program.   About the program  You'll be meeting with a psychiatric doctor to check your mental health. A psychiatric doctor helps you deal with troubling thoughts and feelings by giving you medicine. They'll make sure you know the plan for your care. You may see them for a long time. When you're feeling better, they may refer you back to seeing your family doctor.   If you have any questions, we'll be glad to talk to you.  About visits  Be open  At your visits, please talk openly about your problems. It may feel hard, but it's the best way for us to help you.  Cancelling visits  If you can't come to your visit, please call us right away at [phone]. If you don't cancel at least 24 hours (1 full day) before your visit, that's \"late cancellation.\"  Not showing up for your visits  Being very late is the same as not showing up. You'll be a \"no show\" if:  You're more than 15 minutes late for a 30-minute (half hour) visit.  You're more than 30 minutes late for a 60-minute (full hour) visit.  If you cancel late or don't show up 2 times within 6 months, we may end your care.  Getting help between visits  If you need help between visits, you can call us Monday to Friday from 8 a.m. to 4:30 p.m. at [phone].  Emergency care  Call 911 or go to the nearest emergency department if your life or someone else's life is in danger.  Call 988 anytime to reach the national Suicide and Crisis hotline.  Medicine refills  To refill your medicine, call your pharmacy. You can also call [facility]'s Behavioral Access at [phone], Monday to Friday, 8 a.m. to 4:30 p.m. It can take 1 to 3 business days to get a refill.   Forms, letters, and tests  You may have papers to fill out, like FMLA, short-term disability, and workability. We can help you with these forms at your visits, but you must have an " DR marcelo office called needs a referral sent to there office pt has an appt on 11/15/19 for Panritic  cyst     [facility]  Fax number is [phone]    Phone number is [phone] appointment. You may need more than 1 visit for this, to be in an intensive therapy program, or both.  Before we can give you medicine for ADHD, we may refer you to get tested for it or confirm it another way.  We may not be able to give you an emotional support animal letter.  We don't do mental health checks ordered by the court.   We don't do mental health testing, but we can refer you to get tested.   Thank you for choosing us for your care.  For informational purposes only. Not to replace the advice of your health care provider. Copyright   2022 [facility]. All rights reserved. [employer] [id number] - 12/22.       Treatment Plan:      1.  Bupropion  mg daily with breakfast  2.  Buspirone 30 mg 2 times daily  3.  Vraylar 4.5 mg daily  4.  Abilify discontinued  5.  Mirtazapine 7.5 mg at bedtime  6.  Paroxetine 40 mg in the evening  7.  Continue talk therapy    Continue all other medical directions per primary care provider.   Continue all other medications as reviewed per electronic medical record today.   Safety plan reviewed. To the Emergency Department as needed or call after hours crisis line at [phone] or [phone]. [place] Crisis Text Line: Text [place] to 048412  or  Suicide LifeLine Chat: suicidepreventionlifeline.org/chat/  To schedule individual or family therapy, call [facility] at [phone].   Schedule an appointment with me in 6 weeks or sooner as needed.  Call [facility] at [phone] to schedule.  Follow up with primary care provider as planned or for acute medical concerns.  Call the psychiatric nurse line with medication questions or concerns at [phone].  Medialivehart may be used to communicate with your provider, but this is not intended to be used for emergencies.        Crisis Resources   The [facility] is an adults only unit located at [facility] in [place] is a short term (generally less than 23 hour stay) designed for crisis  intervention and stabilization. Pts have the opportunity to meet quickly with a behavioral health team for evaluation in a calm and peaceful therapuetic environment. To be evaluated for admission pts are triaged throught the [facility] ED.      The following hotlines are for both adults and children. The and are open 24 hours a day, 7 days a week unless noted otherwise.        Crisis Lines      Crisis Text Line  Text 030557  You will be connected with a trained live crisis counselor to provide support.        Gambling Hotline  2.693.559.8300 [hope]        línea de crisis española  935.964.2497        [place] Blogic Helpline  069.172.8030        National Hope Line  9.835.279.4700 [hope]        National Suicide Prevention Lifeline  Free and confidential support  988 or 1.452.519.TALK [8255]  http://suicidepreventionlifeline.org        [employer] (LGBTQ Youth Crisis Line)  4.537.363.4847  text START to 026-876        [place] Crisis Line  8.956.193.2191 (Press 1)  or text 507554    [facility] Mental Health Crisis Response  Within [place], call **CRISIS [**157974] to be connected to a mental health professional who can assist you.        [facility] Crisis  [phone]      [facility] Mobile Crisis  [phone]      [facility] Crisis  [phone]      [facility] Mobile Crisis  [phone] (adults)  [phone] (children)      [facility] Mobile Crisis  [phone] (adults)  [phone] (children)      [facility] Mobile Crisis  [phone]      [facility] Mobile Crisis  [phone]    Community Resources      Fast Tracker  Linking people to mental health and substance use disorder resources  fasttrackermn.org        [place] Mental Health Warmline  Peer to peer support  5 pm to 9 am 7 days/week  8.846.150.5560  https://mnwitw.org/ashwini        National [place] on Mental Illness (SOBEIDA)  813.534.3549 or 1.888.SOBEIDA.HELPS   https://[facility].org/        [facility]  [facility] residents only   [address] [facility]  [phone]        [facility]  Free mental health counseling  https://walkin.org/  612.870.0565 X2    Mental Health Apps      Calm Harm  https://calmharm.co.uk/      My3  https://my3app.org/      [place] Safety Plan  https://www.mysafetyplan.org/